# Patient Record
Sex: MALE | HISPANIC OR LATINO | Employment: FULL TIME | URBAN - METROPOLITAN AREA
[De-identification: names, ages, dates, MRNs, and addresses within clinical notes are randomized per-mention and may not be internally consistent; named-entity substitution may affect disease eponyms.]

---

## 2019-01-17 ENCOUNTER — TRANSFERRED RECORDS (OUTPATIENT)
Dept: HEALTH INFORMATION MANAGEMENT | Facility: CLINIC | Age: 54
End: 2019-01-17

## 2019-01-18 ENCOUNTER — MEDICAL CORRESPONDENCE (OUTPATIENT)
Dept: HEALTH INFORMATION MANAGEMENT | Facility: CLINIC | Age: 54
End: 2019-01-18

## 2019-01-18 ENCOUNTER — TRANSFERRED RECORDS (OUTPATIENT)
Dept: HEALTH INFORMATION MANAGEMENT | Facility: CLINIC | Age: 54
End: 2019-01-18

## 2019-01-19 ENCOUNTER — OFFICE VISIT (OUTPATIENT)
Dept: URGENT CARE | Facility: URGENT CARE | Age: 54
End: 2019-01-19
Payer: COMMERCIAL

## 2019-01-19 VITALS
HEART RATE: 66 BPM | SYSTOLIC BLOOD PRESSURE: 150 MMHG | OXYGEN SATURATION: 99 % | DIASTOLIC BLOOD PRESSURE: 90 MMHG | WEIGHT: 183.7 LBS

## 2019-01-19 DIAGNOSIS — H46.9 OPTIC NEURITIS: Primary | ICD-10-CM

## 2019-01-19 DIAGNOSIS — R03.0 ELEVATED BLOOD PRESSURE READING WITHOUT DIAGNOSIS OF HYPERTENSION: ICD-10-CM

## 2019-01-19 LAB
ANION GAP SERPL CALCULATED.3IONS-SCNC: 3 MMOL/L (ref 3–14)
BASOPHILS # BLD AUTO: 0 10E9/L (ref 0–0.2)
BASOPHILS NFR BLD AUTO: 0.4 %
BUN SERPL-MCNC: 19 MG/DL (ref 7–30)
CALCIUM SERPL-MCNC: 8.8 MG/DL (ref 8.5–10.1)
CHLORIDE SERPL-SCNC: 106 MMOL/L (ref 94–109)
CO2 SERPL-SCNC: 28 MMOL/L (ref 20–32)
CREAT SERPL-MCNC: 0.82 MG/DL (ref 0.66–1.25)
DIFFERENTIAL METHOD BLD: NORMAL
EOSINOPHIL # BLD AUTO: 0.2 10E9/L (ref 0–0.7)
EOSINOPHIL NFR BLD AUTO: 3.8 %
ERYTHROCYTE [DISTWIDTH] IN BLOOD BY AUTOMATED COUNT: 12.6 % (ref 10–15)
GFR SERPL CREATININE-BSD FRML MDRD: >90 ML/MIN/{1.73_M2}
GLUCOSE SERPL-MCNC: 107 MG/DL (ref 70–99)
HCT VFR BLD AUTO: 43 % (ref 40–53)
HGB BLD-MCNC: 15.1 G/DL (ref 13.3–17.7)
LYMPHOCYTES # BLD AUTO: 1.4 10E9/L (ref 0.8–5.3)
LYMPHOCYTES NFR BLD AUTO: 26.8 %
MCH RBC QN AUTO: 31.9 PG (ref 26.5–33)
MCHC RBC AUTO-ENTMCNC: 35.1 G/DL (ref 31.5–36.5)
MCV RBC AUTO: 91 FL (ref 78–100)
MONOCYTES # BLD AUTO: 0.5 10E9/L (ref 0–1.3)
MONOCYTES NFR BLD AUTO: 10.1 %
NEUTROPHILS # BLD AUTO: 3.1 10E9/L (ref 1.6–8.3)
NEUTROPHILS NFR BLD AUTO: 58.9 %
PLATELET # BLD AUTO: 194 10E9/L (ref 150–450)
POTASSIUM SERPL-SCNC: 4.7 MMOL/L (ref 3.4–5.3)
RBC # BLD AUTO: 4.74 10E12/L (ref 4.4–5.9)
SODIUM SERPL-SCNC: 137 MMOL/L (ref 133–144)
WBC # BLD AUTO: 5.3 10E9/L (ref 4–11)

## 2019-01-19 PROCEDURE — 80048 BASIC METABOLIC PNL TOTAL CA: CPT | Performed by: PHYSICIAN ASSISTANT

## 2019-01-19 PROCEDURE — 36415 COLL VENOUS BLD VENIPUNCTURE: CPT | Performed by: PHYSICIAN ASSISTANT

## 2019-01-19 PROCEDURE — 99203 OFFICE O/P NEW LOW 30 MIN: CPT | Performed by: PHYSICIAN ASSISTANT

## 2019-01-19 PROCEDURE — 85025 COMPLETE CBC W/AUTO DIFF WBC: CPT | Performed by: PHYSICIAN ASSISTANT

## 2019-01-19 NOTE — PATIENT INSTRUCTIONS
(H46.9) Optic neuritis  (primary encounter diagnosis)  Comment:   Plan: keep follow up with ophthalmologic neurology 2/5/19    (R03.0) Elevated blood pressure reading without diagnosis of hypertension  Comment:   Plan: CBC with platelets and differential, Basic         metabolic panel  (Ca, Cl, CO2, Creat, Gluc, K,         Na, BUN)    Establish care with primary clinic, follow up within 1 week

## 2019-01-19 NOTE — PROGRESS NOTES
No chief complaint on file.    SUBJECTIVE:   Brian Espinal is a 53 year old male presenting with concerns about his blood pressure and glucose levels.  He was also seen by ophthalmology this morning and was advised to have his blood pressure and glucose checked, as some of his eye symptoms may be caused by diabetes and/or high blood pressure.   The ophthalmologist has diagnosed him with optic neuritis.      Seeing neuro ophthalmologist on 2/5/19    SH: he is here with his daughter today who translates for him per his request.        PMH:  Optic neuritis    FH:  Denies any significant family history      There is no problem list on file for this patient.    Social History     Tobacco Use     Smoking status: Never Smoker   Substance Use Topics     Alcohol use: Not on file       ROS:  CONSTITUTIONAL:NEGATIVE for fever, chills, change in weight  INTEGUMENTARY/SKIN: NEGATIVE for worrisome rashes, moles or lesions  EYES: as per HPI  ENT/MOUTH: NEGATIVE for ear, mouth and throat problems  RESP:NEGATIVE for significant cough or SOB  CV: NEGATIVE for chest pain, palpitations or peripheral edema  GI: NEGATIVE for nausea, abdominal pain, heartburn, or change in bowel habits  MUSCULOSKELETAL: NEGATIVE for significant arthralgias or myalgia  NEURO: as per HPI  ENDOCRINE: NEGATIVE for temperature intolerance, skin/hair changes  Review of systems negative except as stated above.    OBJECTIVE  :/90   Pulse 66   Wt 83.3 kg (183 lb 11.2 oz)   SpO2 99%   GENERAL APPEARANCE: healthy, alert and no distress  RESP: lungs clear to auscultation - no rales, rhonchi or wheezes  CV: regular rates and rhythm, normal S1 S2, no murmur noted  NEURO: Normal strength and tone, sensory exam grossly normal,  normal speech and mentation  SKIN: no suspicious lesions or rashes  EXTREMITIES: no edema    (H46.9) Optic neuritis  (primary encounter diagnosis)  Comment:   Plan: keep follow up with ophthalmologic neurology 2/5/19    (R03.0) Elevated  blood pressure reading without diagnosis of hypertension  Comment:   Plan: CBC with platelets and differential, Basic         metabolic panel  (Ca, Cl, CO2, Creat, Gluc, K,         Na, BUN)    Establish care with primary clinic, follow up within 1 week    Patient expresses understanding and agreement with the assessment and plan as above.

## 2019-01-21 ENCOUNTER — TELEPHONE (OUTPATIENT)
Dept: OPHTHALMOLOGY | Facility: CLINIC | Age: 54
End: 2019-01-21

## 2019-01-21 ENCOUNTER — OFFICE VISIT (OUTPATIENT)
Dept: INTERNAL MEDICINE | Facility: CLINIC | Age: 54
End: 2019-01-21
Payer: COMMERCIAL

## 2019-01-21 VITALS
OXYGEN SATURATION: 96 % | SYSTOLIC BLOOD PRESSURE: 120 MMHG | RESPIRATION RATE: 16 BRPM | HEART RATE: 76 BPM | TEMPERATURE: 98.6 F | WEIGHT: 181 LBS | DIASTOLIC BLOOD PRESSURE: 80 MMHG

## 2019-01-21 DIAGNOSIS — R03.0 ELEVATED BLOOD PRESSURE READING WITHOUT DIAGNOSIS OF HYPERTENSION: Primary | ICD-10-CM

## 2019-01-21 LAB
ALBUMIN UR-MCNC: NEGATIVE MG/DL
ANION GAP SERPL CALCULATED.3IONS-SCNC: 1 MMOL/L (ref 3–14)
APPEARANCE UR: CLEAR
BACTERIA #/AREA URNS HPF: ABNORMAL /HPF
BILIRUB UR QL STRIP: NEGATIVE
BUN SERPL-MCNC: 20 MG/DL (ref 7–30)
CALCIUM SERPL-MCNC: 8.8 MG/DL (ref 8.5–10.1)
CHLORIDE SERPL-SCNC: 108 MMOL/L (ref 94–109)
CHOLEST SERPL-MCNC: 175 MG/DL
CO2 SERPL-SCNC: 30 MMOL/L (ref 20–32)
COLOR UR AUTO: YELLOW
CREAT SERPL-MCNC: 0.82 MG/DL (ref 0.66–1.25)
GFR SERPL CREATININE-BSD FRML MDRD: >90 ML/MIN/{1.73_M2}
GLUCOSE SERPL-MCNC: 110 MG/DL (ref 70–99)
GLUCOSE UR STRIP-MCNC: NEGATIVE MG/DL
HDLC SERPL-MCNC: 33 MG/DL
HGB UR QL STRIP: NEGATIVE
KETONES UR STRIP-MCNC: NEGATIVE MG/DL
LDLC SERPL CALC-MCNC: 106 MG/DL
LEUKOCYTE ESTERASE UR QL STRIP: NEGATIVE
MUCOUS THREADS #/AREA URNS LPF: PRESENT /LPF
NITRATE UR QL: NEGATIVE
NONHDLC SERPL-MCNC: 142 MG/DL
PH UR STRIP: 5.5 PH (ref 5–7)
POTASSIUM SERPL-SCNC: 4.7 MMOL/L (ref 3.4–5.3)
RBC #/AREA URNS AUTO: ABNORMAL /HPF
SODIUM SERPL-SCNC: 139 MMOL/L (ref 133–144)
SOURCE: ABNORMAL
SP GR UR STRIP: 1.02 (ref 1–1.03)
TRIGL SERPL-MCNC: 179 MG/DL
UROBILINOGEN UR STRIP-ACNC: 0.2 EU/DL (ref 0.2–1)
WBC #/AREA URNS AUTO: ABNORMAL /HPF

## 2019-01-21 PROCEDURE — 36415 COLL VENOUS BLD VENIPUNCTURE: CPT | Performed by: INTERNAL MEDICINE

## 2019-01-21 PROCEDURE — 80048 BASIC METABOLIC PNL TOTAL CA: CPT | Performed by: INTERNAL MEDICINE

## 2019-01-21 PROCEDURE — 80061 LIPID PANEL: CPT | Performed by: INTERNAL MEDICINE

## 2019-01-21 PROCEDURE — 99214 OFFICE O/P EST MOD 30 MIN: CPT | Performed by: INTERNAL MEDICINE

## 2019-01-21 PROCEDURE — 81001 URINALYSIS AUTO W/SCOPE: CPT | Performed by: INTERNAL MEDICINE

## 2019-01-21 NOTE — TELEPHONE ENCOUNTER
Left message for patient to return my call with use of interp to see if can seen sooner than currently scheduled on 2/5.  Can have him see Dr. Kurtz on Friday 1/25 at 12:15 pm.  If ok can schedule and will need sensorimotor, v/t/gtop/rnfl

## 2019-01-21 NOTE — PATIENT INSTRUCTIONS
- I will contact you with lab results from today.     - If lab testing looks OK, please return in about 6 months for a re-check of your blood pressure.

## 2019-01-21 NOTE — PROGRESS NOTES
SUBJECTIVE:   Brian Espinal is a 53 year old male who presents to clinic today for the following health issues:      ED/UC Followup:    Facility:  [facility]  Date of visit: 01/19/2019  Reason for visit: Optic neuritis/BP  Current Status: Feels ok, his eye concerns him (cannot control eye lid muscle)             Problem list and histories reviewed & adjusted, as indicated.  Additional history: as documented    Here for follow-up urgent care visit, at which time his blood pressure was slightly elevated.  He has no previous history of hypertension, hyperlipidemia, diabetes.  He does not smoke.  He does some resistance training, but no regular aerobic exercise.  Today's blood pressure noted.    Continues to follow-up with ophthalmology regarding recent diagnosis of optic neuritis.    Reviewed and updated as needed this visit by clinical staff       Reviewed and updated as needed this visit by Provider         ROS:  Constitutional, HEENT, cardiovascular, pulmonary, GI, , musculoskeletal, neuro, skin, endocrine and psych systems are negative, except as otherwise noted.    OBJECTIVE:     /80   Pulse 76   Temp 98.6  F (37  C) (Oral)   Resp 16   Wt 82.1 kg (181 lb)   SpO2 96%   There is no height or weight on file to calculate BMI.  GENERAL: healthy, alert and no distress  NECK: no adenopathy, no asymmetry, masses, or scars and thyroid normal to palpation  RESP: lungs clear to auscultation - no rales, rhonchi or wheezes  CV: regular rate and rhythm, normal S1 S2, no S3 or S4, no murmur, click or rub, no peripheral edema and peripheral pulses strong  ABDOMEN: soft, nontender, no hepatosplenomegaly, no masses and bowel sounds normal  MS: no gross musculoskeletal defects noted, no edema        ASSESSMENT/PLAN:     1. Elevated blood pressure reading without diagnosis of hypertension  Today's blood pressure within normal limits on multiple rechecks.  At this point we will not commit to antihypertensive  therapy, but will do further risk stratification with fasting lab work today, and have him return in 6 months for recheck.  Did advise regular aerobic exercise.  - Lipid panel reflex to direct LDL Fasting  - Basic metabolic panel  (Ca, Cl, CO2, Creat, Gluc, K, Na, BUN)  - UA with Microscopic reflex to Culture        Imtiaz Martin MD  [facility]

## 2019-01-24 DIAGNOSIS — H53.10 SUBJECTIVE VISUAL DISTURBANCE: Primary | ICD-10-CM

## 2019-01-25 ENCOUNTER — APPOINTMENT (OUTPATIENT)
Dept: CT IMAGING | Facility: CLINIC | Age: 54
End: 2019-01-25
Payer: COMMERCIAL

## 2019-01-25 ENCOUNTER — DOCUMENTATION ONLY (OUTPATIENT)
Dept: OPHTHALMOLOGY | Facility: CLINIC | Age: 54
End: 2019-01-25

## 2019-01-25 ENCOUNTER — OFFICE VISIT (OUTPATIENT)
Dept: OPHTHALMOLOGY | Facility: CLINIC | Age: 54
End: 2019-01-25
Attending: OPHTHALMOLOGY
Payer: COMMERCIAL

## 2019-01-25 ENCOUNTER — HOSPITAL ENCOUNTER (EMERGENCY)
Facility: CLINIC | Age: 54
Discharge: HOME | End: 2019-01-25
Attending: EMERGENCY MEDICINE | Admitting: EMERGENCY MEDICINE
Payer: COMMERCIAL

## 2019-01-25 ENCOUNTER — APPOINTMENT (OUTPATIENT)
Dept: GENERAL RADIOLOGY | Facility: CLINIC | Age: 54
End: 2019-01-25
Payer: COMMERCIAL

## 2019-01-25 VITALS
RESPIRATION RATE: 16 BRPM | SYSTOLIC BLOOD PRESSURE: 159 MMHG | TEMPERATURE: 98.5 F | OXYGEN SATURATION: 99 % | WEIGHT: 180 LBS | HEART RATE: 80 BPM | DIASTOLIC BLOOD PRESSURE: 79 MMHG

## 2019-01-25 DIAGNOSIS — H53.40 VISUAL FIELD DEFECT: ICD-10-CM

## 2019-01-25 DIAGNOSIS — H49.02: ICD-10-CM

## 2019-01-25 DIAGNOSIS — H49.02 CRANIAL NERVE III PALSY, LEFT: Primary | ICD-10-CM

## 2019-01-25 LAB
ANION GAP SERPL CALCULATED.3IONS-SCNC: 11 MMOL/L (ref 3–14)
BASOPHILS # BLD AUTO: 0 10E9/L (ref 0–0.2)
BASOPHILS NFR BLD AUTO: 0.3 %
BUN SERPL-MCNC: 17 MG/DL (ref 7–30)
CALCIUM SERPL-MCNC: 8.6 MG/DL (ref 8.5–10.1)
CHLORIDE SERPL-SCNC: 106 MMOL/L (ref 94–109)
CO2 SERPL-SCNC: 24 MMOL/L (ref 20–32)
CREAT SERPL-MCNC: 0.83 MG/DL (ref 0.66–1.25)
DIFFERENTIAL METHOD BLD: NORMAL
EOSINOPHIL # BLD AUTO: 0.1 10E9/L (ref 0–0.7)
EOSINOPHIL NFR BLD AUTO: 1.1 %
ERYTHROCYTE [DISTWIDTH] IN BLOOD BY AUTOMATED COUNT: 12.5 % (ref 10–15)
GFR SERPL CREATININE-BSD FRML MDRD: >90 ML/MIN/{1.73_M2}
GLUCOSE SERPL-MCNC: 92 MG/DL (ref 70–99)
HCT VFR BLD AUTO: 44.1 % (ref 40–53)
HGB BLD-MCNC: 15.2 G/DL (ref 13.3–17.7)
IMM GRANULOCYTES # BLD: 0 10E9/L (ref 0–0.4)
IMM GRANULOCYTES NFR BLD: 0.3 %
LYMPHOCYTES # BLD AUTO: 1.4 10E9/L (ref 0.8–5.3)
LYMPHOCYTES NFR BLD AUTO: 21.8 %
MCH RBC QN AUTO: 31.5 PG (ref 26.5–33)
MCHC RBC AUTO-ENTMCNC: 34.5 G/DL (ref 31.5–36.5)
MCV RBC AUTO: 92 FL (ref 78–100)
MONOCYTES # BLD AUTO: 0.5 10E9/L (ref 0–1.3)
MONOCYTES NFR BLD AUTO: 8.7 %
NEUTROPHILS # BLD AUTO: 4.2 10E9/L (ref 1.6–8.3)
NEUTROPHILS NFR BLD AUTO: 67.8 %
NRBC # BLD AUTO: 0 10*3/UL
NRBC BLD AUTO-RTO: 0 /100
PLATELET # BLD AUTO: 233 10E9/L (ref 150–450)
POTASSIUM SERPL-SCNC: 3.8 MMOL/L (ref 3.4–5.3)
RBC # BLD AUTO: 4.82 10E12/L (ref 4.4–5.9)
SODIUM SERPL-SCNC: 141 MMOL/L (ref 133–144)
TSH SERPL DL<=0.005 MIU/L-ACNC: 1.84 MU/L (ref 0.4–4)
WBC # BLD AUTO: 6.2 10E9/L (ref 4–11)

## 2019-01-25 PROCEDURE — 85025 COMPLETE CBC W/AUTO DIFF WBC: CPT | Performed by: EMERGENCY MEDICINE

## 2019-01-25 PROCEDURE — 25000128 H RX IP 250 OP 636: Performed by: EMERGENCY MEDICINE

## 2019-01-25 PROCEDURE — 99285 EMERGENCY DEPT VISIT HI MDM: CPT | Mod: Z6 | Performed by: EMERGENCY MEDICINE

## 2019-01-25 PROCEDURE — 80048 BASIC METABOLIC PNL TOTAL CA: CPT | Performed by: EMERGENCY MEDICINE

## 2019-01-25 PROCEDURE — 96361 HYDRATE IV INFUSION ADD-ON: CPT | Performed by: EMERGENCY MEDICINE

## 2019-01-25 PROCEDURE — 99285 EMERGENCY DEPT VISIT HI MDM: CPT | Mod: 25 | Performed by: EMERGENCY MEDICINE

## 2019-01-25 PROCEDURE — 96360 HYDRATION IV INFUSION INIT: CPT | Mod: 59 | Performed by: EMERGENCY MEDICINE

## 2019-01-25 PROCEDURE — G0463 HOSPITAL OUTPT CLINIC VISIT: HCPCS | Mod: ZF | Performed by: TECHNICIAN/TECHNOLOGIST

## 2019-01-25 PROCEDURE — 84443 ASSAY THYROID STIM HORMONE: CPT | Performed by: EMERGENCY MEDICINE

## 2019-01-25 PROCEDURE — 25000128 H RX IP 250 OP 636: Performed by: STUDENT IN AN ORGANIZED HEALTH CARE EDUCATION/TRAINING PROGRAM

## 2019-01-25 PROCEDURE — 70498 CT ANGIOGRAPHY NECK: CPT

## 2019-01-25 PROCEDURE — 71046 X-RAY EXAM CHEST 2 VIEWS: CPT

## 2019-01-25 PROCEDURE — 25000125 ZZHC RX 250: Performed by: STUDENT IN AN ORGANIZED HEALTH CARE EDUCATION/TRAINING PROGRAM

## 2019-01-25 PROCEDURE — 92133 CPTRZD OPH DX IMG PST SGM ON: CPT | Mod: ZF | Performed by: OPHTHALMOLOGY

## 2019-01-25 PROCEDURE — 92083 EXTENDED VISUAL FIELD XM: CPT | Mod: ZF | Performed by: OPHTHALMOLOGY

## 2019-01-25 RX ORDER — SODIUM CHLORIDE 9 MG/ML
1000 INJECTION, SOLUTION INTRAVENOUS CONTINUOUS
Status: DISCONTINUED | OUTPATIENT
Start: 2019-01-25 | End: 2019-01-25 | Stop reason: HOSPADM

## 2019-01-25 RX ORDER — POLYVINYL ALCOHOL 14 MG/ML
1 SOLUTION/ DROPS OPHTHALMIC PRN
Qty: 15 ML | Refills: 0 | Status: ON HOLD | OUTPATIENT
Start: 2019-01-25 | End: 2019-07-07

## 2019-01-25 RX ORDER — IOPAMIDOL 755 MG/ML
75 INJECTION, SOLUTION INTRAVASCULAR ONCE
Status: COMPLETED | OUTPATIENT
Start: 2019-01-25 | End: 2019-01-25

## 2019-01-25 RX ADMIN — SODIUM CHLORIDE, PRESERVATIVE FREE 90 ML: 5 INJECTION INTRAVENOUS at 16:47

## 2019-01-25 RX ADMIN — IOPAMIDOL 75 ML: 755 INJECTION, SOLUTION INTRAVENOUS at 16:47

## 2019-01-25 RX ADMIN — SODIUM CHLORIDE 1000 ML: 9 INJECTION, SOLUTION INTRAVENOUS at 14:51

## 2019-01-25 ASSESSMENT — CUP TO DISC RATIO
OD_RATIO: 0.35
OS_RATIO: 0.3

## 2019-01-25 ASSESSMENT — EXTERNAL EXAM - LEFT EYE: OS_EXAM: NORMAL

## 2019-01-25 ASSESSMENT — ENCOUNTER SYMPTOMS
SHORTNESS OF BREATH: 0
NAUSEA: 0
HEADACHES: 0
DIARRHEA: 0
VOMITING: 0
FEVER: 0
NECK PAIN: 0

## 2019-01-25 ASSESSMENT — VISUAL ACUITY
OS_SC: 20/20
METHOD: SNELLEN - LINEAR
OS_SC+: -1
OD_SC: 20/20

## 2019-01-25 ASSESSMENT — TONOMETRY
IOP_METHOD: ICARE
OD_IOP_MMHG: 18
OS_IOP_MMHG: 17

## 2019-01-25 ASSESSMENT — EXTERNAL EXAM - RIGHT EYE: OD_EXAM: NORMAL

## 2019-01-25 ASSESSMENT — SLIT LAMP EXAM - LIDS
COMMENTS: COMPLETE PTOSIS
COMMENTS: NORMAL

## 2019-01-25 NOTE — ED TRIAGE NOTES
Pt sent from eye clinic for CT scan. Has noticed worsening left eyelid drooping for 9 days. Reports no other significant past medical history. Takes only a multivitamin daily.

## 2019-01-25 NOTE — ED AVS SNAPSHOT
[facility], [place], Emergency Department  [address] [place] [id number]  Phone:  [phone]                                    Brian Espinal   MRN: [id number]    Department:  [facility], Emergency Department   Date of Visit:  1/25/2019           After Visit Summary Signature Page    I have received my discharge instructions, and my questions have been answered. I have discussed any challenges I see with this plan with the nurse or doctor.    ..........................................................................................................................................  Patient/Patient Representative Signature      ..........................................................................................................................................  Patient Representative Print Name and Relationship to Patient    ..................................................               ................................................  Date                                   Time    ..........................................................................................................................................  Reviewed by Signature/Title    ...................................................              ..............................................  Date                                               Time          22EPIC Rev 08/18

## 2019-01-25 NOTE — PROGRESS NOTES
Assessment & Plan     Brian Espinal is a 53 year old male with the following diagnoses:   1. Cranial nerve III palsy, left       Referred for possible optic neuritis of the left eye. 2 weeks ago, Brian noticed pain when moving left eye. He then noticed left upper eyelid ptosis that started Wednesday 1/16/19. Approximately 1 week prior, he noticed that couldn't move the left eye. Completed ptosis by Thursday 1/17/19. He was given prednisolone eye drops by his optometrist, which did not help so he stopped using it.     MRI brain and orbits with and without contrast 1/18/19: unremarkable except for some microvascular changes. I personally reviewed the scan and agree.      He otherwise denies any headaches. No jaw claudication.  No scalp tenderness.     No history of HTN, Diabetes mellitus, HLP. He states that he gets an annual physical and is generally healthy.      Meds: vitamins.     On examination, his visual acuity is 20/20 both eyes. He has a 1mm relative anisocoria (left greater than right) in light and dark. There was no evidence of afferent pupillary defect. COlor plates were full both eyes. He has significant adduction, elevation and depression deficit left eye. Abduction was full left eye. He has almost a complete left upper eyelid ptosis. The anterior segment examination and funduscopic examination was unremarkable. His CN 4, 5, 6, 7, 8, 11, 12 were unremarkable.     In summary, Brian presents with left third nerve palsy. He does not have optic neuritis. His description of progressive symptoms is extremely concerning for left PCOM aneurysm. I discussed thoroughly with patient and his wife (through ) that he needs to emergently go to the emergency room for CTA of the head to evaluate for LEFT PCOM aneurysm. Patient verbalizes understanding. We called transport and I provided sign out to the ED physician.         Complete documentation of historical and exam elements from today's  encounter can be found in the full encounter summary report (not reduplicated in this progress note).  I personally obtained the chief complaint(s) and history of present illness.  I confirmed and edited as necessary the review of systems, past medical/surgical history, family history, social history, and examination findings as documented by others; and I examined the patient myself.  I personally reviewed the relevant tests, images, and reports as documented above.  I formulated and edited as necessary the assessment and plan and discussed the findings and management plan with the patient and family. - Aries Kurtz MD

## 2019-01-25 NOTE — ED NOTES
Bed: IN03  Expected date: 1/25/19  Expected time:   Means of arrival:   Comments:  Brian Kaye [phone] 54 y/o M sent from optho clinic with L cranial 3rd nerve palsy. Sent for CTA of head to evaluate for PCOM aneurysm.

## 2019-01-25 NOTE — NURSING NOTE
Chief Complaint(s) and History of Present Illness(es)     Diplopia Evaluation     Disease is new.  Associated symptoms include droopy eyelid.              Comments     Eye pain since 2 weeks ago according to spouse ( per ).   Patient states pain in left eye when moving both eyes together.   Blurry vision left eye.  Ptosis VICKY possibly started last Wednesday 1/16/19. Complete ptosis VICKY on Thursday 1/17/19 when he went to ER on Thursday.   No double because of VICKY ptosis but if he tries to lift his VICKY, he notices double.     No diabetes, or HTN.   Blood pressure was elevated per patient. But fine after the second time they checked it last Saturday.   Blood labs done as well - no results yet per patient.     JOE Schultz 1/25/2019 11:13 AM

## 2019-01-25 NOTE — ED PROVIDER NOTES
History     Chief Complaint   Patient presents with     Eye Problem     The history is provided by the patient. The history is limited by a language barrier. A  was used (Honduran).     Brian Espinal is an otherwise healthy 53 year old male  who presents to the Emergency Department today from his eye clinic for further evaluation of left eye problem.  The patient reports that about 10 days ago he started having a left droopy eyelid.  He reports that his symptoms continue to worsen and now he is not able to move his left eye.  The patient was seen by his ophthalmologist today who told him to present to the ED for further evaluation of possible aneurysm restricting blood flow to left eye.  The patient reports that he is still able to see out of his left eye, but is unable to move his left eye or open his left eyelid.  He does report monocular vision loss in the nasal aspect of his left visual field but no dark spots or bright lights. He also reports having some double vision off and on.  Patient reports that he has had a recent decrease in taste on both sides of his tongue.  No headache, neck pain, chest pain, shortness breath, diarrhea, nausea, vomiting. He denies history of diabetes or hypertension.     I have reviewed the Medications, Allergies, Past Medical and Surgical History, and Social History in the [employer] system.    Past Medical History:   Diagnosis Date     NO ACTIVE PROBLEMS      Past Surgical History:   Procedure Laterality Date     GI SURGERY      Possible appendectomy at age 9?     History reviewed. No pertinent family history.    Social History     Tobacco Use     Smoking status: Never Smoker     Smokeless tobacco: Never Used   Substance Use Topics     Alcohol use: Yes     Comment: 3 drinks per month     No current facility-administered medications for this encounter.      No current outpatient medications on file.     Facility-Administered Medications Ordered in Other Encounters    Medication     glucose gel 15-30 g    Or     dextrose 50 % injection 25-50 mL    Or     glucagon injection 1 mg     glucose gel 15-30 g    Or     dextrose 50 % injection 25-50 mL    Or     glucagon injection 1 mg     HYDROcodone-acetaminophen (NORCO) 5-325 MG per tablet 1-2 tablet     lidocaine (LMX4) cream     lidocaine 1 % 1 mL     medication instruction     metoclopramide (REGLAN) tablet 10 mg    Or     metoclopramide (REGLAN) injection 10 mg     naloxone (NARCAN) injection 0.1-0.4 mg     naloxone (NARCAN) injection 0.1-0.4 mg     ondansetron (ZOFRAN-ODT) ODT tab 4 mg    Or     ondansetron (ZOFRAN) injection 4 mg     prochlorperazine (COMPAZINE) injection 10 mg    Or     prochlorperazine (COMPAZINE) tablet 10 mg    Or     prochlorperazine (COMPAZINE) Suppository 25 mg     sodium chloride (PF) 0.9% PF flush 3 mL     sodium chloride (PF) 0.9% PF flush 3 mL     sodium chloride 0.9% infusion     sodium chloride 0.9% infusion        Allergies   Allergen Reactions     No Known Allergies         Review of Systems   Constitutional: Negative for fever.   Eyes: Positive for visual disturbance (double vision; blind spots to medial vision of left eye). Negative for photophobia, pain and discharge.        Unable to move left eye or open left upper eyelid   Respiratory: Negative for cough and shortness of breath.    Cardiovascular: Negative for chest pain.   Gastrointestinal: Negative for diarrhea, nausea and vomiting.   Musculoskeletal: Negative for gait problem and neck pain.   Skin: Negative for rash and wound.   Neurological: Negative for dizziness, facial asymmetry, speech difficulty, weakness and headaches.   Psychiatric/Behavioral: Negative for confusion.   All other systems reviewed and are negative.    Physical Exam   BP: 159/79  Pulse: 80  Temp: 98.5  F (36.9  C)  Resp: 16  Weight: 81.6 kg (180 lb)  SpO2: 99 %    Physical Exam   Constitutional: He is oriented to person, place, and time. He appears well-developed  and well-nourished. No distress.   HENT:   Head: Normocephalic and atraumatic. Head is without contusion, without laceration, without right periorbital erythema and without left periorbital erythema.   Nose: Nose normal.   Mouth/Throat: Oropharynx is clear and moist.   Eyes: Conjunctivae are normal. Pupils are equal, round, and reactive to light. Right eye exhibits no chemosis and no discharge. No foreign body present in the right eye. Left eye exhibits no chemosis and no discharge. No foreign body present in the left eye. Right conjunctiva is not injected. Left conjunctiva is not injected. No scleral icterus. Right eye exhibits normal extraocular motion and no nystagmus. Left eye exhibits abnormal extraocular motion. Left eye exhibits no nystagmus.   Left upper eyelid ptosis. Left eye deviated down and out.   Neck: Normal range of motion. Neck supple.   Cardiovascular: Normal rate, regular rhythm, normal heart sounds and intact distal pulses.   Pulmonary/Chest: Effort normal and breath sounds normal. No stridor. No respiratory distress. He has no wheezes. He has no rales.   Abdominal: Soft. He exhibits no distension. There is no tenderness. There is no guarding.   Musculoskeletal: Normal range of motion. He exhibits no deformity.   Neurological: He is alert and oriented to person, place, and time. He has normal strength. He displays no atrophy and no tremor. No sensory deficit. He exhibits normal muscle tone. Coordination and gait normal. GCS eye subscore is 4. GCS verbal subscore is 5. GCS motor subscore is 6.   Isolated CN III deficit   Skin: Skin is warm and dry. No rash noted. He is not diaphoretic.   Psychiatric: He has a normal mood and affect. His behavior is normal. Thought content normal.   Nursing note and vitals reviewed.      ED Course   1:48 PM  The patient was seen and examined by Dr. West in Room [facility].       Procedures              Critical Care time:  none             Labs Ordered and Resulted from  Time of ED Arrival Up to the Time of Departure from the ED   TSH WITH FREE T4 REFLEX   BASIC METABOLIC PANEL   CBC WITH PLATELETS DIFFERENTIAL   PERIPHERAL IV CATHETER            Assessments & Plan (with Medical Decision Making)   Brian Espinal is a 53 year old male referred from ophtho clinic with down and out left eye deviation and ptosis progressive over last week.     Ddx: CVA, intracerebral/carotid aneurysm, dissection, claudine's syndrome, pancoast tumor, microvascular disease    CN III palsy with bilaterally reactive and grossly equal pupils on my exam. No recent headaches or neck pain. Gradual progression concerning for possible malignancy vs microvascular disease. Will obtain CTA head and neck as well as CXR to screen for left apical mass.     Xray wnl.     Signed out to Dr. Leiva pending results of CT angiogram of the head and neck. Referring ophthalmologist called in requesting call back after CTA completed. She did not want to see patient but would like to know the results of CTA.      I have reviewed the nursing notes.    I have reviewed the findings, diagnosis, plan and need for follow up with the patient.    Final diagnoses:   Third cranial nerve palsy, left   I, Eddie Corral, am serving as a trained medical scribe to document services personally performed by Preeti West MD, based on the provider's statements to me.   IPreeti MD, was physically present and have reviewed and verified the accuracy of this note documented by Eddie Corral.     1/25/2019   [facility], EMERGENCY DEPARTMENT     Preeti West MD  01/26/19 1938

## 2019-01-26 ENCOUNTER — HOSPITAL ENCOUNTER (OUTPATIENT)
Facility: CLINIC | Age: 54
Setting detail: OBSERVATION
Discharge: HOME | DRG: 123 | End: 2019-01-27
Attending: EMERGENCY MEDICINE | Admitting: RADIOLOGY
Payer: COMMERCIAL

## 2019-01-26 ENCOUNTER — APPOINTMENT (OUTPATIENT)
Dept: INTERVENTIONAL RADIOLOGY/VASCULAR | Facility: CLINIC | Age: 54
DRG: 123 | End: 2019-01-26
Payer: COMMERCIAL

## 2019-01-26 DIAGNOSIS — I67.1 CEREBRAL ANEURYSM, NONRUPTURED: ICD-10-CM

## 2019-01-26 DIAGNOSIS — H49.02 WEAKNESS OF LEFT THIRD CRANIAL NERVE: ICD-10-CM

## 2019-01-26 PROBLEM — H49.00 THIRD NERVE PALSY: Status: ACTIVE | Noted: 2019-01-26

## 2019-01-26 PROCEDURE — 99152 MOD SED SAME PHYS/QHP 5/>YRS: CPT

## 2019-01-26 PROCEDURE — 96375 TX/PRO/DX INJ NEW DRUG ADDON: CPT | Performed by: EMERGENCY MEDICINE

## 2019-01-26 PROCEDURE — 25500064 ZZH RX 255 OP 636: Performed by: RADIOLOGY

## 2019-01-26 PROCEDURE — 27210908 ZZH NEEDLE CR4

## 2019-01-26 PROCEDURE — 12000001 ZZH R&B MED SURG/OB UMMC

## 2019-01-26 PROCEDURE — 27210732 ZZH ACCESSORY CR1

## 2019-01-26 PROCEDURE — 27210889 ZZH ACCESSORY CR8

## 2019-01-26 PROCEDURE — C1887 CATHETER, GUIDING: HCPCS

## 2019-01-26 PROCEDURE — 25000128 H RX IP 250 OP 636: Performed by: STUDENT IN AN ORGANIZED HEALTH CARE EDUCATION/TRAINING PROGRAM

## 2019-01-26 PROCEDURE — 99285 EMERGENCY DEPT VISIT HI MDM: CPT | Mod: 25 | Performed by: EMERGENCY MEDICINE

## 2019-01-26 PROCEDURE — C1769 GUIDE WIRE: HCPCS

## 2019-01-26 PROCEDURE — 99153 MOD SED SAME PHYS/QHP EA: CPT

## 2019-01-26 PROCEDURE — 27210802 ZZH SHEATH CR1

## 2019-01-26 PROCEDURE — 27210738 ZZH ACCESSORY CR2

## 2019-01-26 PROCEDURE — 36224 PLACE CATH CAROTD ART: CPT | Mod: 50

## 2019-01-26 PROCEDURE — 36226 PLACE CATH VERTEBRAL ART: CPT

## 2019-01-26 PROCEDURE — G0378 HOSPITAL OBSERVATION PER HR: HCPCS

## 2019-01-26 PROCEDURE — 25000125 ZZHC RX 250: Performed by: STUDENT IN AN ORGANIZED HEALTH CARE EDUCATION/TRAINING PROGRAM

## 2019-01-26 PROCEDURE — 99285 EMERGENCY DEPT VISIT HI MDM: CPT | Mod: Z6 | Performed by: EMERGENCY MEDICINE

## 2019-01-26 PROCEDURE — 36227 PLACE CATH XTRNL CAROTID: CPT | Mod: 50

## 2019-01-26 PROCEDURE — 25800030 ZZH RX IP 258 OP 636: Performed by: STUDENT IN AN ORGANIZED HEALTH CARE EDUCATION/TRAINING PROGRAM

## 2019-01-26 PROCEDURE — 27210905 ZZH KIT CR7

## 2019-01-26 PROCEDURE — 96374 THER/PROPH/DIAG INJ IV PUSH: CPT | Performed by: EMERGENCY MEDICINE

## 2019-01-26 RX ORDER — HEPARIN SODIUM 1000 [USP'U]/ML
2000 INJECTION, SOLUTION INTRAVENOUS; SUBCUTANEOUS
Status: COMPLETED | OUTPATIENT
Start: 2019-01-26 | End: 2019-01-26

## 2019-01-26 RX ORDER — SODIUM CHLORIDE 9 MG/ML
INJECTION, SOLUTION INTRAVENOUS CONTINUOUS
Status: DISCONTINUED | OUTPATIENT
Start: 2019-01-26 | End: 2019-01-27 | Stop reason: HOSPADM

## 2019-01-26 RX ORDER — NALOXONE HYDROCHLORIDE 0.4 MG/ML
.1-.4 INJECTION, SOLUTION INTRAMUSCULAR; INTRAVENOUS; SUBCUTANEOUS
Status: DISCONTINUED | OUTPATIENT
Start: 2019-01-26 | End: 2019-01-27 | Stop reason: HOSPADM

## 2019-01-26 RX ORDER — PROCHLORPERAZINE 25 MG
25 SUPPOSITORY, RECTAL RECTAL EVERY 12 HOURS PRN
Status: DISCONTINUED | OUTPATIENT
Start: 2019-01-26 | End: 2019-01-27 | Stop reason: HOSPADM

## 2019-01-26 RX ORDER — DEXTROSE MONOHYDRATE 25 G/50ML
25-50 INJECTION, SOLUTION INTRAVENOUS
Status: DISCONTINUED | OUTPATIENT
Start: 2019-01-26 | End: 2019-01-27 | Stop reason: HOSPADM

## 2019-01-26 RX ORDER — HYDROCODONE BITARTRATE AND ACETAMINOPHEN 5; 325 MG/1; MG/1
1-2 TABLET ORAL EVERY 4 HOURS PRN
Status: DISCONTINUED | OUTPATIENT
Start: 2019-01-26 | End: 2019-01-27 | Stop reason: HOSPADM

## 2019-01-26 RX ORDER — ONDANSETRON 2 MG/ML
4 INJECTION INTRAMUSCULAR; INTRAVENOUS EVERY 6 HOURS PRN
Status: DISCONTINUED | OUTPATIENT
Start: 2019-01-26 | End: 2019-01-27 | Stop reason: HOSPADM

## 2019-01-26 RX ORDER — IODIXANOL 320 MG/ML
150 INJECTION, SOLUTION INTRAVASCULAR ONCE
Status: COMPLETED | OUTPATIENT
Start: 2019-01-26 | End: 2019-01-26

## 2019-01-26 RX ORDER — NICOTINE POLACRILEX 4 MG
15-30 LOZENGE BUCCAL
Status: DISCONTINUED | OUTPATIENT
Start: 2019-01-26 | End: 2019-01-27 | Stop reason: HOSPADM

## 2019-01-26 RX ORDER — LIDOCAINE 40 MG/G
CREAM TOPICAL
Status: DISCONTINUED | OUTPATIENT
Start: 2019-01-26 | End: 2019-01-27 | Stop reason: HOSPADM

## 2019-01-26 RX ORDER — METOCLOPRAMIDE 5 MG/1
10 TABLET ORAL EVERY 6 HOURS PRN
Status: DISCONTINUED | OUTPATIENT
Start: 2019-01-26 | End: 2019-01-27 | Stop reason: HOSPADM

## 2019-01-26 RX ORDER — METOCLOPRAMIDE HYDROCHLORIDE 5 MG/ML
10 INJECTION INTRAMUSCULAR; INTRAVENOUS EVERY 6 HOURS PRN
Status: DISCONTINUED | OUTPATIENT
Start: 2019-01-26 | End: 2019-01-27 | Stop reason: HOSPADM

## 2019-01-26 RX ORDER — PROCHLORPERAZINE MALEATE 10 MG
10 TABLET ORAL EVERY 6 HOURS PRN
Status: DISCONTINUED | OUTPATIENT
Start: 2019-01-26 | End: 2019-01-27 | Stop reason: HOSPADM

## 2019-01-26 RX ORDER — FLUMAZENIL 0.1 MG/ML
0.2 INJECTION, SOLUTION INTRAVENOUS
Status: DISCONTINUED | OUTPATIENT
Start: 2019-01-26 | End: 2019-01-26

## 2019-01-26 RX ORDER — ONDANSETRON 4 MG/1
4 TABLET, ORALLY DISINTEGRATING ORAL EVERY 6 HOURS PRN
Status: DISCONTINUED | OUTPATIENT
Start: 2019-01-26 | End: 2019-01-27 | Stop reason: HOSPADM

## 2019-01-26 RX ORDER — FENTANYL CITRATE 50 UG/ML
25-50 INJECTION, SOLUTION INTRAMUSCULAR; INTRAVENOUS EVERY 5 MIN PRN
Status: DISCONTINUED | OUTPATIENT
Start: 2019-01-26 | End: 2019-01-26

## 2019-01-26 RX ADMIN — FENTANYL CITRATE 100 MCG: 50 INJECTION, SOLUTION INTRAMUSCULAR; INTRAVENOUS at 14:30

## 2019-01-26 RX ADMIN — IODIXANOL 40 ML: 320 INJECTION, SOLUTION INTRAVASCULAR at 14:39

## 2019-01-26 RX ADMIN — HEPARIN SODIUM 2000 UNITS: 1000 INJECTION, SOLUTION INTRAVENOUS; SUBCUTANEOUS at 13:58

## 2019-01-26 RX ADMIN — LIDOCAINE HYDROCHLORIDE 8 ML: 10 INJECTION, SOLUTION EPIDURAL; INFILTRATION; INTRACAUDAL; PERINEURAL at 13:55

## 2019-01-26 RX ADMIN — HEPARIN SODIUM 1000 ML: 1000 INJECTION, SOLUTION INTRAVENOUS; SUBCUTANEOUS at 14:20

## 2019-01-26 RX ADMIN — MIDAZOLAM HYDROCHLORIDE 2 MG: 2 INJECTION, SOLUTION INTRAMUSCULAR; INTRAVENOUS at 14:30

## 2019-01-26 RX ADMIN — SODIUM CHLORIDE: 9 INJECTION, SOLUTION INTRAVENOUS at 16:16

## 2019-01-26 ASSESSMENT — ENCOUNTER SYMPTOMS
WEAKNESS: 0
COUGH: 0
SPEECH DIFFICULTY: 0
FACIAL ASYMMETRY: 0
DIZZINESS: 0
EYE DISCHARGE: 0
EYE PAIN: 1
WOUND: 0
EYE PAIN: 0
CONFUSION: 0
PHOTOPHOBIA: 0

## 2019-01-26 ASSESSMENT — VISUAL ACUITY
OU: OTHER (SEE COMMENT)

## 2019-01-26 ASSESSMENT — ACTIVITIES OF DAILY LIVING (ADL)
ADLS_ACUITY_SCORE: 15
ADLS_ACUITY_SCORE: 15

## 2019-01-26 ASSESSMENT — MIFFLIN-ST. JEOR: SCORE: 1598.35

## 2019-01-26 NOTE — PROGRESS NOTES
"Brief Update:    Patient presented to clinic today with symptoms of a left 3rd nerve palsy with concern for compression and was sent to ED for CTA to rule out aneurysm. Initial CTA read from radiology without mention of aneurysm so patient released from ED by ED staff. 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 Discussed with neuro-IR fellow who agrees with presence of small aneurysm and recommends formal cerebral angiogram for evaluation. Per neuro-IR fellow, aneurysm is small and questionable whether causing the palsy symptoms.    PLAN:  - Neuro-IR fellow to discuss with attending regarding need for additional work-up (cerebral angiogram). Neuro-IR will plan to contact patient to schedule procedure and/or follow-up    Darrel Young MD  Ophthalmology Resident, PGY-2  Pager: [phone]    "

## 2019-01-26 NOTE — DISCHARGE INSTRUCTIONS
Please make an appointment to follow up with Eye Clinic (phone: [phone]) in 7-10 days.  Artificial tears to the left eye 4 times/ day as needed .

## 2019-01-26 NOTE — PROGRESS NOTES
Patient Name: Brian Espinal  Medical Record Number: [id number]  Today's Date: 1/26/2019    Procedure: Diagnostic Cerebral Angiogram with Femoral Artery Closure Device  Proceduralist: Dr. Santiago Horton and Dr.Tapan Carlson    Sedation start time: 1350  Sedation end time: 1430  Sedation medications administered: Fentanyl 100 mcg, Versed 2 mg  Total sedation time: 40 minutes    Procedure start time: 1350  Puncture time: 1353  Procedure end time: 1430    Report given to: CRISTIAN Gray 6A  : n/a    Other Notes: Pt arrived to IR room #3 from Emergency Dept. Consent reviewed. Pt denies any questions or concerns regarding procedure. Pt positioned supine and monitored per protocol. Cerebral Angiogram done. [employer] closure device deployed to right femoral artery at 1425. Flat bedrest until 1625.  See flowsheet for Neuro exam. Pt tolerated procedure without any noted complications. Pt transferred to Unit 6A post-procedure.

## 2019-01-26 NOTE — ED NOTES
Patient signed out to me by Dr West with head and neck CTA pending.    Patient with several days of diplopia and ptosis. Seen in eye clinic this morning. MRI WNL. Referred to ED for CTA of head and neck. 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 No other lesions identified. Complete eye exam earlier today, note reviewed.    Discussed with ophthalmology on call.     He can follow up in the eye clinic. No secific treatment recommended.     Faustino Gutierrez MD  01/25/19 [id number]

## 2019-01-26 NOTE — H&P
[facility], [place]     Endovascular Surgical Neuroradiology Pre-Procedure Note      HPI:  Brian Espinal is a 53 year old male who is in good health otherwise has been having subacute inciduous onset of dipplopia, left eye pain that progressed to near complete third nerve palsy with parasympathetic and motor component. His MRI is normal, and had CTA for which he was in the ER yesterday. 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 Further reviewing of the images does show asymmetric opacification of left transverse sinus and larger SOV compared to right side. We called the patient and he did mention of mild to moderate pain in the eye which is certainly worse than the onset of this episode.     Medical History:  Past Medical History:   Diagnosis Date     NO ACTIVE PROBLEMS        Surgical History:  Past Surgical History:   Procedure Laterality Date     GI SURGERY      Possible appendectomy at age 9?       Family History:  History reviewed. No pertinent family history.    Social History:  Social History     Socioeconomic History     Marital status:      Spouse name: Not on file     Number of children: 3     Years of education: Not on file     Highest education level: Not on file   Social Needs     Financial resource strain: Not on file     Food insecurity - worry: Not on file     Food insecurity - inability: Not on file     Transportation needs - medical: Not on file     Transportation needs - non-medical: Not on file   Occupational History     Not on file   Tobacco Use     Smoking status: Never Smoker     Smokeless tobacco: Never Used   Substance and Sexual Activity     Alcohol use: Yes     Comment: 3 drinks per month     Drug use: No     Sexual activity: Not on file   Other Topics Concern     Not on file   Social History Narrative     Not on file       Allergies:  No Known Allergies    Is there a contrast allergy?  No    Medications:    (Not in a  hospital admission).    ROS:  The 5 point Review of Systems is negative other than noted in the HPI or here.     PHYSICAL EXAMINATION  Vital Signs:  B/P: 152/77,  T: 98.4,  P: 68,  R: 16    Cardio:  RRR  Pulmonary:  no respiratory distress  Abdomen:  soft    Alert, oriented to time place and person.  Speech fluent with normal naming, repetition, comprehension. Left pupil reactive but slightly larger than right, the left eye is depressed and abducted with most gaze restriction in upward and medial direction.  Visual fields full.  Facial sensation, movement normal.  Palate moves symmetrically.  Tongue midline.  Sternocleidomastoid and trapezius strength intact.  Motor 5/5 in all four extremitis, sensation intact to touch bilaterally without any neglect.  No dysmetria noted.  Gait deferred.     LABS  (most recent Cr, BUN, GFR, PLT, INR, PTT within the past 7 days):  Recent Labs   Lab 01/25/19  1452   CR 0.83   BUN 17   GFRESTIMATED >90   GFRESTBLACK >90           Platelet Function P2Y12 (PRU):  Not applicable      ASSESSMENT: Possible CC fistula on left side. Small hypophysial aneurysm also noted.    PLAN: Diagnostic cerebral angiogram         PRE-PROCEDURE SEDATION ASSESSMENT     Pre-Procedure Sedation Assessment done at 1400.    Expected Level:  Moderate Sedation    Indication:  Sedation is required to allow for neurointerventional procedure.    Consent obtained from patient after discussing the risks, benefits and alternatives.     PO Intake:  Appropriately NPO for procedure    ASA Class:  Class 2 - MILD SYSTEMIC DISEASE, NO ACUTE PROBLEMS, NO FUNCTIONAL LIMITATIONS.    Mallampati:  Grade 2:  Soft palate, base of uvula, tonsillar pillars, and portion of posterior pharyngeal wall visible    History and physical reviewed and no updates needed. I have reviewed the lab findings, diagnostic data, medications, and the plan for sedation. I have determined this patient to be an appropriate candidate for the planned  sedation and procedure and have reassessed the patient IMMEDIATELY PRIOR to sedation and procedure.    Patient was discussed with the Attending, Dr. Horton, who agrees with the plan.    Arnulfo Carlson   Pager: [phone]

## 2019-01-26 NOTE — ED NOTES
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 Neurointerventional to take to IR from ED.

## 2019-01-26 NOTE — PROCEDURES
[facility], [place]     Endovascular Surgical Neuroradiology Post-Procedure Note    Pre-Procedure Diagnosis:  Left third nerve palsy   Post-Procedure Diagnosis:  Left third nerve palsy     Procedure(s):   Diagnostic cervicocerebral angiography    Findings:  No identifiable vascular lesion explaining right 3rd nerve palsy     Plan:  Admit overnight for observation     Primary Surgeon:  Dr. Santiago Horton  Secondary Surgeon:  Not applicable  Secondary Surgeon Review:  None  Fellow:  Robyn  Additional Assistants:  TEGAN    Prior to the start of the procedure and with procedural staff participation, I verbally confirmed: the patient s identity using two indicators, relevant allergies, that the procedure was appropriate and matched the consent or emergent situation, and that the correct equipment/implants were available. Immediately prior to starting the procedure I conducted the Time Out with the procedural staff and re-confirmed the patient s name, procedure, and site/side. (The Joint Commission universal protocol was followed.)  Yes    PRU value: Not applicable    Anesthesia:  Conscious Sedation  Medications:  Fentanyl, Methohexital  Puncture site:  Right Femoral Artery    Fluoroscopy time (minutes):  18.9  Radiation dose (mGy):  1614    Contrast amount (mL): 40    Estimated blood loss (mL):  Minimal     Closure:  Device Starclose     Disposition:  Will be followed in hospital by the Neuro Endovascular team.        Sedation Post-Procedure Summary    Sedatives: Fentanyl and Midazolam (Versed)    Vital signs and pulse oximetry were monitored and remained stable throughout the procedure, and sedation was maintained until the procedure was complete.  The patient was monitored by staff until sedation discharge criteria were met.    Patient tolerance:  Patient tolerated the procedure well with no immediate complications.    Time of sedation in minutes:  60 minutes from beginning to end of  physician one to one monitoring.    Arnulfo Carlson  Pager:  [phone]

## 2019-01-26 NOTE — ED PROVIDER NOTES
[place] EMERGENCY DEPARTMENT ([facility])  January 26, 2019    History     Chief Complaint   Patient presents with     Eye Problem     A  was used (Use of Official iPad ).     Brian Espinal is a 53 year old male who presents to the ED for abnormal lab result. Per chart review, patient has been seen and evaluated yesterday in the ED and Ophthalmology for 11 days of ongoing left eye problem which Ophthalmology diagnosed as  3rd nerve palsy with concern for a compression or aneurysm.  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  Per telephone note yesterday, Neuro-IR agrees with the present of small aneurysm and they advised the patient to return to the ED for cerebral angiogram for further evaluation.    Patient states he was told to come back to the ED but was unsure what was going on.  States that he has some left eye pain with movement and reports that he can open his eyelid.    PAST MEDICAL HISTORY  Past Medical History:   Diagnosis Date     NO ACTIVE PROBLEMS      PAST SURGICAL HISTORY  Past Surgical History:   Procedure Laterality Date     GI SURGERY      Possible appendectomy at age 9?     FAMILY HISTORY  History reviewed. No pertinent family history.  SOCIAL HISTORY  Social History     Tobacco Use     Smoking status: Never Smoker     Smokeless tobacco: Never Used   Substance Use Topics     Alcohol use: Yes     Comment: 3 drinks per month     MEDICATIONS  No current facility-administered medications for this encounter.      Current Outpatient Medications   Medication     polyvinyl alcohol (LIQUIFILM TEARS) 1.4 % ophthalmic solution     Multiple Vitamins-Minerals (MULTIVITAMIN ADULT PO)     ALLERGIES  No Known Allergies      I have reviewed the Medications, Allergies, Past Medical and Surgical History, and Social History in the Epic  "system.    Review of Systems   Eyes: Positive for pain (L).   All other systems reviewed and are negative.      Physical Exam   BP: 135/79  Pulse: 74  Temp: 98.4  F (36.9  C)  Resp: 16  Height: 165.1 cm (5' 5\")  Weight: 81.6 kg (180 lb)  SpO2: 98 %      Physical Exam  Physical Exam   Constitutional: oriented to person, place, and time. appears well-developed and well-nourished.   HENT:   Head: Normocephalic and atraumatic.  Neck: Normal range of motion.   Pulmonary/Chest: Effort normal. No respiratory distress.   Cardiac: No murmurs, rubs, gallops. RRR.  Abdominal: Abdomen soft, nontender, nondistended. No rebound tenderness.  MSK: Long bones without deformity or evidence of trauma  Neurological: alert and oriented to person, place, and time. Left pupil 4 mm , Right pupil 3 mm.  Left pupil is not reactive to light with direct and indirect light.  Right pupil normally contracts to light.  Left eye is held abducted with slight downward preference.  Unable to move the left eye superiorly or medially.  Strength in upper and lower extremities intact.  No C-spine tenderness.  Sensation over the face intact.  No facial droop.  Unable to elevate left eyelid.  Skin: Skin is warm and dry.   Psychiatric:  normal mood and affect.  behavior is normal. Thought content normal.     ED Course        Procedures   11:21 AM  The patient was seen and examined by Dr. Hidalgo in Room 14.       Labs Ordered and Resulted from Time of ED Arrival Up to the Time of Departure from the ED - No data to display         Assessments & Plan (with Medical Decision Making)   MDM  Patient presenting with ongoing difficulty with I.  Does appear that he has a cranial nerve third palsy on the left side, possibly due to an aneurysm.  Neuro IR to come in to see the patient for angiogram and admission.    I have reviewed the nursing notes.    I have reviewed the findings, diagnosis, plan and need for follow up with the patient.       Medication List      There " are no discharge medications for this visit.         Final diagnoses:   Cerebral aneurysm, nonruptured   Weakness of left third cranial nerve     I, Nathan Marcum, am serving as a trained medical scribe to document services personally performed by Harrison Hidalgo MD, based on the provider's statements to me.      IHarrison MD, was physically present and have reviewed and verified the accuracy of this note documented by Nathan Marcum.     1/26/2019   [facility], [place], EMERGENCY DEPARTMENT     Harrison Hidalgo MD  01/26/19 4783

## 2019-01-26 NOTE — ED TRIAGE NOTES
Pt presents with c/o left eye drooping. Seen in ED yesterday and told to return for additional test.

## 2019-01-27 VITALS
HEIGHT: 65 IN | OXYGEN SATURATION: 97 % | TEMPERATURE: 97.5 F | DIASTOLIC BLOOD PRESSURE: 62 MMHG | RESPIRATION RATE: 16 BRPM | BODY MASS INDEX: 29.99 KG/M2 | SYSTOLIC BLOOD PRESSURE: 159 MMHG | WEIGHT: 180 LBS | HEART RATE: 62 BPM

## 2019-01-27 LAB
ANION GAP SERPL CALCULATED.3IONS-SCNC: 8 MMOL/L (ref 3–14)
BUN SERPL-MCNC: 21 MG/DL (ref 7–30)
CALCIUM SERPL-MCNC: 8.2 MG/DL (ref 8.5–10.1)
CHLORIDE SERPL-SCNC: 109 MMOL/L (ref 94–109)
CO2 SERPL-SCNC: 23 MMOL/L (ref 20–32)
CREAT SERPL-MCNC: 0.79 MG/DL (ref 0.66–1.25)
ERYTHROCYTE [DISTWIDTH] IN BLOOD BY AUTOMATED COUNT: 12.3 % (ref 10–15)
GFR SERPL CREATININE-BSD FRML MDRD: >90 ML/MIN/{1.73_M2}
GLUCOSE SERPL-MCNC: 96 MG/DL (ref 70–99)
HCT VFR BLD AUTO: 42.6 % (ref 40–53)
HGB BLD-MCNC: 14.7 G/DL (ref 13.3–17.7)
MCH RBC QN AUTO: 32.1 PG (ref 26.5–33)
MCHC RBC AUTO-ENTMCNC: 34.5 G/DL (ref 31.5–36.5)
MCV RBC AUTO: 93 FL (ref 78–100)
PLATELET # BLD AUTO: 182 10E9/L (ref 150–450)
POTASSIUM SERPL-SCNC: 4 MMOL/L (ref 3.4–5.3)
RBC # BLD AUTO: 4.58 10E12/L (ref 4.4–5.9)
SODIUM SERPL-SCNC: 140 MMOL/L (ref 133–144)
WBC # BLD AUTO: 5.8 10E9/L (ref 4–11)

## 2019-01-27 PROCEDURE — 80048 BASIC METABOLIC PNL TOTAL CA: CPT | Performed by: STUDENT IN AN ORGANIZED HEALTH CARE EDUCATION/TRAINING PROGRAM

## 2019-01-27 PROCEDURE — 85027 COMPLETE CBC AUTOMATED: CPT | Performed by: STUDENT IN AN ORGANIZED HEALTH CARE EDUCATION/TRAINING PROGRAM

## 2019-01-27 PROCEDURE — G0378 HOSPITAL OBSERVATION PER HR: HCPCS

## 2019-01-27 PROCEDURE — 36415 COLL VENOUS BLD VENIPUNCTURE: CPT | Performed by: STUDENT IN AN ORGANIZED HEALTH CARE EDUCATION/TRAINING PROGRAM

## 2019-01-27 ASSESSMENT — ACTIVITIES OF DAILY LIVING (ADL)
ADLS_ACUITY_SCORE: 15
ADLS_ACUITY_SCORE: 10

## 2019-01-27 NOTE — PLAN OF CARE
Pt POD#2 R diagnostic cerebral angiogram, vs ex HTN w/in parameters, neuros include: a/o x4, blurry vision when pt has both eyes open, L eye palsy, can't track L. PIV removed per discharge orders, regular diet, voiding spont, up ad michelle. R groin site CDI, soft, no bruising and/or drainage noted. RN went over discharge education via  phone w/wife and pt, pt and wife actively participating in education. Pt's daughter picked him up to take home.

## 2019-01-27 NOTE — PHARMACY-ADMISSION MEDICATION HISTORY
"Admission medication history interview status for the 1/26/2019 admission is complete. See Epic admission navigator for allergy information, pharmacy, prior to admission medications and immunization status.     Medication history interview sources:  Pt, med list, and Surescripts     Changes made to PTA medication list (reason)  Added: None  Deleted: Yes, Multiple Vitamins-Minerals \"Neurobion\" injection- Pt reported finishing all the 3 dose of the medication yesterday.  Changed: None     Additional medication history information (including reliability of information, actions taken by pharmacist):  Reliability: Pt knows his medication well   Pharmacy: [email or web address] -phone:[phone]  Allergies: Confirmed   - Pt confirmed not taking other prescription med, OTCs, supplements, topicals, recreational drug, or herbals.    Prior to Admission medications    Medication Sig Last Dose Taking? Auth Provider   polyvinyl alcohol (LIQUIFILM TEARS) 1.4 % ophthalmic solution Place 1 drop Into the left eye as needed for dry eyes 1/25/2019 at Unknown time Yes Faustino Gutierrez MD         Medication history completed by: Hina Marley, [email or web address], Student Pharmacist     "

## 2019-01-27 NOTE — PLAN OF CARE
D AVSS with sat's 97% on room air. Heart regular and lungs decreased in bases otherwise clear. Voiced no c/o pain or nausea during the night with CMS intact in l/e and slept well between cares. Up to bathroom independently to void an adequate amount. Spouse in room for support.   I Vital's, assessment and med's per order.   A Resting in bed with call light in reach.   P Continue to monitor and update Md with changes. Patient hoping to be discharge to home today.

## 2019-01-27 NOTE — DISCHARGE SUMMARY
[facility], [place]    Endovascular Surgical Neuroradiology Discharge Summary    Date of Admission: 1/26/2019  Date of Discharge: 01/27/2019    Disposition: Discharged to home  Primary Care Physician: No Ref-Primary, Physician      Admission Diagnosis:   Left 3rd nerve palsy    Discharge Diagnosis:   Left 3rd nerve palsy    Problem Leading to Hospitalization (from HPI):     Please see H&P dated 1/26/2019 for further details about presentation.    Brief Hospital Course:   Patient was admitted to neurointensive care unit for overnight observation after procedure. He had an uneventful course. There were no new problems. Patient was tolerating oral diet and was ambulating independently. Her groin access site was dry and intact without any evidence of hematoma or tenderness. She was provided detailed explanation about her treatment and follow-up plan. She was discharged home in stable condition.     Complications: None.     Other problems addressed during this hospitalization:    PHYSICAL EXAMINATION  Vital Signs:  B/P: 159/62, T: 97.5, P: 62, R: 16    Mentation: Awake, alert & oriented x3  Speech: Normal. No dysarthria or aphasia  Cranial nerve exam: Intact except left 3rd Shoulder shrug strong B/L, tongue movements intact  Motor: Strength 5/5 throughout  Sensations: Intact B/L to light touch  Coordination: Finger to nose test intact B/L  Gait: Deferred    mRS 6 - Dead.    Medications    Current Discharge Medication List      CONTINUE these medications which have NOT CHANGED    Details   polyvinyl alcohol (LIQUIFILM TEARS) 1.4 % ophthalmic solution Place 1 drop Into the left eye as needed for dry eyes  Qty: 15 mL, Refills: 0             Additional recommendations and follow up:       Occupational Therapy Referral      Reason for your hospital stay    You were admitted for further evaluation of right eye weakness.  You had a diagnostic cerebral angiogram which did not show any vascular  abnormality explaining right eye weakness. Please follow up with ophthalmology for further follow up.     Adult [facility] Follow-up and recommended labs and tests    Follow up with primary care provider, Physician No Ref-Primary, within 7 days for hospital follow- up.  No follow up labs or test are needed.      Appointments on [place] and/or [facility] (with [facility] or [facility] provider or service). Call [phone] if you haven't heard regarding these appointments within 7 days of discharge.     Activity    What you may eat or drink after angiogram:  -- There are no changes in what you should eat or drink after this test except that you should drink at least six to eight 8-ounce glasses of fluid each day for 2 days to flush the contrast (dye) out of your body unless you are on a limited fluids diet.    Moving around after angiogram:  -- After your test: Rest 48 hours and follow the special activity instructions listed.    Special activity restrictions:  -- Limit physical activity for 48 hours.  Rest on a sofa or bed as much as possible.  -- No strenuous activity.  -- No long walks.  -- Avoid climbing stairs if possible.  If you must climb stairs, do it slowly.    Lifting:  -- Do not lift more than 10 pounds for 48 hours.   -- Do not lift more than 20 pounds for 7 days. (A full gallon of water weighs about 8 pounds)    Sexual activity:  -- You can resume sexual activity in 2 days.  Bathing/Swimming:  -- You may shower in 24 hours .  -- You may NOT take a tub bath, swim, or sit in water for 5 days.    The groin puncture site:  Care:  -- Keep the area clean and dry except for during daily shower.  -- Clean the site daily using soap and water while standing in the shower.  Pat dry thoroughly.  -- Cover the groin site with a bandaid until the skin has closed.   -- When you sneeze, cough or laugh, hold pressure on the puncture site.  -- If you must strain when having a bowel movement, hold gentle pressure to the  puncture site.    Anesthesia or sedation information:  You have received medication for your procedure that made you sleepy:  -- You may be sleepy, feel less coordinated or feel weak.  To prevent injury, be careful when you walk, bathe, cook or use electrical devices/tools.  -- You may NOT drive or operate mechanical equipment until 24 hours after your procedure.  You also must stop taking narcotic pain medications before driving.  -- A responsible adult should remain with you for at least 24 hours after your procedure.  You should stay at home and rest today.  -- This may cause you to react differently to alcohol.  Do not drink alcohol for at least 24 hours after your procedure.  -- This may cause you to not think clearly.  Do not make important decisions for 24 hours after your procedure.  -- To prevent burns, do not smoke.    Local anesthesia:  -- You had local anesthesia (numbing medicine) for your procedure.  The numbness should go away a few hours after the procedure.    Your medicines:  -- It is important that you take the medicines on your list.  Work with your health care provider or pharmacist if you have questions about your medicine.    Return to work:  -- You can return to work in 24 hours if your work does not stop you from following your care instructions (such as lifting).  If your work does not allow you to follow the instructions, you should return to work in 48 hours.     Discharge Instructions    Please see activity instructions.     Reason for your hospital stay    You were admitted to the hospital for left eye weakness. Your diagnostic angiogram did not show any vascular abnormality explaining this weakness. Please follow up with ophthalmology for further follow up.     Diet    Follow this diet upon discharge: Orders Placed This Encounter      Regular Diet Adult     Plan : Follow up with ophthalmology for further management of 3rd nerve palsy. Cerebral angiogram did not show any vascular  explanation of left third nerve palsy.       Patient was discussed with the attending physician, Dr. Horton.    Arnulfo Carlson  Neuro interventional fellow   [phone]

## 2019-01-27 NOTE — PLAN OF CARE
[id number]    Status: Pt admitted w/left third nerve palsy after diagnostic cervicocerebral angiography 1/26   VS: VSS on RA    Neuros: A&Ox4, Intact. Pt states blurry vision at baseline, L eye palsy, pt unable to tract with L eye   GI: Regular diet, adequate intake   :  Voiding spontaneously without difficulty   IV: PIV SL   Activity: SBA   Pain: Pt stated slight pain around angio site, declined intervention   Respiratory/Trach: WNL  Skin: Angio site, CDI, pulse +   Social: Family at bedside, involved in cares, interrupter present for shift    Plan of care: Continue to monitor site, continue with POC, possible discharge tomorrow morning

## 2019-01-28 ENCOUNTER — TELEPHONE (OUTPATIENT)
Dept: INTERNAL MEDICINE | Facility: CLINIC | Age: 54
End: 2019-01-28

## 2019-01-28 NOTE — TELEPHONE ENCOUNTER
"  ED for acute condition Discharge Protocol    \"Hi, my name is Robertarafiq Hawkins, a registered nurse, and I am calling from [facility].  I am calling to follow up and see how things are going for you after your recent emergency visit.\"    Tell me how you are doing now that you are home?\" Doing well. Doctor told me they didn't find much. No weakness. But a little pain when I move my eye but I will f/u with opthamologly       Discharge Instructions    \"Let's review your discharge instructions.  What is/are the follow-up recommendations?  Pt. Response: Follow up with ophthalmology for further management of 3rd nerve palsy. Cerebral angiogram did not show any vascular explanation of left third nerve palsy.       Follow up with primary care provider, Physician No Ref-Primary, within 7 days for hospital follow- up.  No follow up labs or test are needed.       Appointments on [place] and/or [facility] (with [facility] or [facility] provider or service). Call [phone] if you haven't heard regarding these appointments within 7 days of discharge.    \"Has an appointment with your primary care provider been scheduled?\"  No but will see opthalmology first.    Medications    \"Tell me what changed about your medicines when you discharged?\"    None    \"What questions do you have about your medications?\"   None        Call Summary    \"What questions or concerns do you have about your recent visit and your follow-up care?\"     none    \"If you have questions or things don't continue to improve, we encourage you contact us through the main clinic number (give number).  Even if the clinic is not open, triage nurses are available 24/7 to help you.     We would like you to know that our clinic has extended hours (provide information).  We also have urgent care (provide details on closest location and hours/contact info)\"    \"Thank you for your time and take care!\"                "

## 2019-01-28 NOTE — TELEPHONE ENCOUNTER
Patient requesting lab results from 1/21/2019.    He was fasting but did chew gum before his lab draw. Please advise. Ok for him to follow up in 6 months?    Roberta KINNEY RN, BSN, PHN

## 2019-01-29 NOTE — TELEPHONE ENCOUNTER
I actually recommend that he come back to see me in a few weeks.  Given his recent CTA head results, I think we need to be a little more aggressive with cholesterol and blood pressure control.    He should make appointment with me for sometime in the next few weeks.

## 2019-01-31 ENCOUNTER — HOSPITAL ENCOUNTER (OUTPATIENT)
Dept: OCCUPATIONAL THERAPY | Facility: CLINIC | Age: 54
Setting detail: THERAPIES SERIES
End: 2019-01-31
Attending: OPHTHALMOLOGY
Payer: COMMERCIAL

## 2019-01-31 DIAGNOSIS — H49.02 WEAKNESS OF LEFT THIRD CRANIAL NERVE: ICD-10-CM

## 2019-01-31 PROCEDURE — 97535 SELF CARE MNGMENT TRAINING: CPT | Mod: GO,XU | Performed by: REHABILITATION PRACTITIONER

## 2019-01-31 PROCEDURE — 97530 THERAPEUTIC ACTIVITIES: CPT | Mod: GO | Performed by: REHABILITATION PRACTITIONER

## 2019-01-31 PROCEDURE — 97166 OT EVAL MOD COMPLEX 45 MIN: CPT | Mod: GO | Performed by: REHABILITATION PRACTITIONER

## 2019-01-31 ASSESSMENT — VISUAL ACUITY
OD: 20/63
OS: 20/80

## 2019-01-31 ASSESSMENT — ACTIVITIES OF DAILY LIVING (ADL): PRIOR_ADL/IADL_STATUS: INDEPENDENT PRIOR TO ONSET

## 2019-01-31 NOTE — PROGRESS NOTES
Pt was seen for evaluation and treatment on 1/31/19 only.  No further OT was scheduled following this date.  Will discharge patient at this time.           01/31/19 0900   Visit Type   Type of Visit Initial       Present Yes   Language German   General Information   Start Of Care Date 01/31/19   Referring Physician Robyn Arredondo MD   Orders Evaluate And Treat As Indicated   Orders Comment Low vision   Date of Order 01/27/18   Medical Diagnosis 3rd cranial nerve palsy   Onset Of Illness/injury Or Date Of Surgery 1/25/19   Surgical/Medical history reviewed Yes   Additional Occupational Profile Info/Pertinent History of Current Problem diagnostic cerebral angiogram 1/27/19.  Pt was admitted to ICU overnight for observation and discharged home the next day.   Pt continues to report diplopia and is keeping his left eye closed most of the time as a result   Prior ADL/IADL status Independent prior to onset   Others present at visit Spouse/significant other  (Allyn)   Patient/family Goals Statement Decrease double vision.     General information comments Pt reports he is able to close lid and open eye slightly and blink eye.  Pt reports he does not have a current pair of glasses.     Social History/Home Environment   Current Community Support Personal care attendant   Patient Role/employment History  Employed   Avocational Pt is currently working and covers 1 eye while working.  Pt works as a supervisor for cleaning.  Pt reports he checks cleaned areas, purchasing and some computer work completing time cards and payments.     Social/Environment Comment Pt has 3 daughters, 27, 25, 12.     Pain Assessment   Pain Reported No   Comments Pt reports fatigue and pain in left side of head if using left eye.     Fall Risk Screen   Fall screen completed by OT   Have you fallen 2 or more times in the past year? No   Have you fallen and had an injury in the past year? No   Fall screen comments Pt reports  decrease in balance with double vision, no difficulty with covered lens.    Cognitive/Behavioral   Communication Intact   Cognitive Status Intact for evaluation process   Behavior Appropriate   Patient/family aware of diagnosis Yes   How well do you understand your eye condition? Well   Vision related restrictions on visiting with family/friends Mild   Reported emotional impact of visual impairment Mild   Adjustment to disability Good   Physical Status/Equipment   Physical Status No problems observed/reported   Visual Report   Functional Complaints Reading;Work related tasks;Writing;Safety in mobility   Visual Complaints Difficulty maintaining focus;Double vision;Light sensitivity   Complaints comments Double vision, eye fatigue   Technology Computer   Equipment comments Pt reports difficulty with reading on computer.  Pt reports he lost his reading glasses 4 months ago.     Lighting and Glare   Are you satisfied with your sunglasses? Yes   Sunglass filter color Gray   Visual Acuity   Acuity right eye 20/63   Acuity left eye 20/80   Acuity both eyes together 20/63 with [employer] symbols screen, without readers.    Contrast Sensitivity   Contrast sensitivity (score/25) 25/25   Functional Reading Screen   Current optical aids used Reading glasses   Clinical Impression, OT Eval   Criteria for Skilled Therapeutic Interventions Met yes;treatment indicated   Therapy  Diagnosis: Impaired ADL/IADL with deficits in Reading based ADL;Written communication;Home management;Financial management;Work performance   Impairment comments Double vision and eye strain impact all ADL/IADL and mobility tasks.   Assessment of Occupational Performance 3-5 Performance Deficits   Identified Performance Deficits Reading based ADL;Written communication;Home management;Financial management;Work performance   Clinical Decision Making (Complexity) Moderate complexity   Clinical Impression Comments Pt demonstrates clinical need for skilled OT services  to improve ADL/IADL independence and safety.  Pt is motivated to improve and has good family support.     OT Visual Rehabilitation Evaluation Plan   Therapy Plan Occupational therapy intervention   Planned Interventions Visual skills training for safety in mobility;Low vision compensatory training for reading;Instruction in environmental adaptations for glare;Optical device/ADL device instruction and training;Low vision compensatory training for written communication;Instruction in environmental adaptations for lighting;Computer modifications   Frequency / Duration 4 visits within 8 weeks   Risks and Benefits of Treatment have been explained. Yes   Patient, Family in agreement with plan of care Yes   GOALS   Goals Near Vision;Visual Field;Environmental Modification;Resource Education   Goal 1 - Near Vision   Goal Description: Near Vision Patient will verbalize awareness of visual field Loss and demonstrate improved use of visual skills/adaptive equipment for increased independence in reading-based activities of daily living, written communication and detail ADL tasks.   Target Date 03/28/19   Goal 2 - Visual field   Goal Description: Visual field Patient will verbalize awareness of visual field loss and demonstrate improved use of visual skills for increased safety/ independence in locating objects/obstacles and in navigation   Target Date 03/28/19   Goal 3 - Environmental modification   Goal Description: Environment modification Patient will demonstrate understanding of the impact of lighting, contrast and glare on ADL activities, in conjunction with environmentally-based ADL modifications   Target Date 03/28/19   Goal 4 - Resource education   Goal Description: Resource education Patient will verbalize awareness of community resources for the following:;Access to large print materials;Access to low vision devices   Target Date 03/28/19   Total Evaluation Time   OT Eval, Moderate Complexity Minutes (29650) 24    Therapy Certification   Certification date from 01/31/19   Certification date to 03/28/19   Medical Diagnosis Diplopia   Certification I certify the need for these services furnished under this plan of treatment and while under my care.  (Physician co-signature of this document indicates review and certification of the therapy plan).

## 2019-02-04 ENCOUNTER — OFFICE VISIT (OUTPATIENT)
Dept: INTERNAL MEDICINE | Facility: CLINIC | Age: 54
End: 2019-02-04
Payer: COMMERCIAL

## 2019-02-04 VITALS
TEMPERATURE: 98.9 F | DIASTOLIC BLOOD PRESSURE: 75 MMHG | HEART RATE: 88 BPM | SYSTOLIC BLOOD PRESSURE: 140 MMHG | OXYGEN SATURATION: 97 % | WEIGHT: 178 LBS | BODY MASS INDEX: 29.62 KG/M2 | RESPIRATION RATE: 16 BRPM

## 2019-02-04 DIAGNOSIS — I10 ESSENTIAL HYPERTENSION: ICD-10-CM

## 2019-02-04 DIAGNOSIS — H49.02 WEAKNESS OF LEFT THIRD CRANIAL NERVE: Primary | ICD-10-CM

## 2019-02-04 PROCEDURE — 99214 OFFICE O/P EST MOD 30 MIN: CPT | Performed by: INTERNAL MEDICINE

## 2019-02-04 RX ORDER — LISINOPRIL 10 MG/1
10 TABLET ORAL DAILY
Qty: 30 TABLET | Refills: 3 | Status: SHIPPED | OUTPATIENT
Start: 2019-02-04 | End: 2019-07-07

## 2019-02-04 NOTE — PATIENT INSTRUCTIONS
- Start taking lisinopril once daily.  (Prescription has been sent to your pharmacy)    - Please follow-up with me in clinic in 2 months.

## 2019-02-04 NOTE — PROGRESS NOTES
SUBJECTIVE:   Brian Espinal is a 53 year old male who presents to clinic today for the following health issues:      Pt is here to discuss lab results.     Pt has concerns about cholesterol and Blood Pressure (Pt is wondering about Blood pressure medication).       Problem list and histories reviewed & adjusted, as indicated.  Additional history: as documented    Patient has developed a palsy of his left 3rd cranial nerve, with resultant dramatic abduction of his left orbit.  Has been following with an ophthalmologist closely.  Initial neuro imaging (CT angio) suggested a possible small blister-type aneurysm at the expected origin of the left posterior communicating artery.  Subsequent catheter directed cerebral angiogram however was normal, and showed no aneurysm.  There is thus felt to be no vascular cause to his symptoms.  He continues to follow with ophthalmology.    Today's blood pressure noted.  Latest lipid panel as follows:  Lab Results   Component Value Date    HDL 33 01/21/2019     01/21/2019    CHOL 175 01/21/2019    TRIG 179 01/21/2019          Reviewed and updated as needed this visit by clinical staff       Reviewed and updated as needed this visit by Provider         ROS:  Constitutional, HEENT, cardiovascular, pulmonary, GI, , musculoskeletal, neuro, skin, endocrine and psych systems are negative, except as otherwise noted.    OBJECTIVE:     /75   Pulse 88   Temp 98.9  F (37.2  C) (Oral)   Resp 16   Wt 80.7 kg (178 lb)   SpO2 97%   BMI 29.62 kg/m    Body mass index is 29.62 kg/m .  GENERAL: healthy, alert and no distress  NECK: no adenopathy, no asymmetry, masses, or scars and thyroid normal to palpation  RESP: lungs clear to auscultation - no rales, rhonchi or wheezes  CV: regular rate and rhythm, normal S1 S2, no S3 or S4, no murmur, click or rub, no peripheral edema and peripheral pulses strong  ABDOMEN: soft, nontender, no hepatosplenomegaly, no masses and bowel sounds  normal  MS: no gross musculoskeletal defects noted, no edema        ASSESSMENT/PLAN:     1. Weakness of left third cranial nerve  Continue to follow with ophthalmology closely.  There appears to be no obvious vascular etiology to this symptom.  When I initially saw the CT angios results, I was prepared to consider statin therapy given apparent atherosclerotic vascular disease.  However with normal catheter directed angiogram, will forego.    2. Essential hypertension  Do feel that it is important to treat his now established hypertension.  Discussed options, will start lisinopril once daily, return in 3 months for recheck.  - lisinopril (PRINIVIL/ZESTRIL) 10 MG tablet; Take 1 tablet (10 mg) by mouth daily  Dispense: 30 tablet; Refill: 3    See Patient Instructions    Imtiaz Martin MD  [facility]

## 2019-02-11 ENCOUNTER — TELEPHONE (OUTPATIENT)
Dept: INTERNAL MEDICINE | Facility: CLINIC | Age: 54
End: 2019-02-11

## 2019-02-11 NOTE — LETTER
[facility]  [address] [address]  [phone]  February 11, 2019    Brian Espinal  [address]  [facility] [address]    Dear Brian,    We care about your health and based on a review of your medical records, recommend the the following, to better manage your health:      You are in particular need of attention regarding:  -Colon Cancer Screening    I am recommending that you:     -schedule a COLONOSCOPY to look for colon cancer (due every 10 years or 5 years in higher risk situations.)        Colon cancer is now the second leading cause of cancer-related deaths in the [place] \[facility] [place] [facility]\"" for both men and women and there are over 130,000 new cases and 50,000 deaths per year from colon cancer.  Colonoscopies can prevent 90-95% of these deaths.  Problem lesions can be removed before they ever become cancer.  This test is not only looking for cancer, but also getting rid of precancerious lesions.    If you are under/uninsured, we recommend you contact the [employer] program. [employer] is a free colorectal cancer screening program that provides colonoscopies for eligible under/uninsured [place] men and women. If you are interested in receiving a free colonoscopy, please call [employer] at [phone] (mention code [employer]) to see if you re eligible.      If you do not wish to do a colonoscopy or cannot afford to do one, at this time, there is another option. It is called a FIT test or Fecal Immunochemical Occult Blood Test (take home stool sample kit).  It does not replace the colonoscopy for colorectal cancer screening, but it can detect hidden bleeding in the lower colon.  It does need to be repeated every year and if a positive result is obtained, you would be referred for a colonoscopy.          If you have completed either one of these tests at another facility, please call with the details of when and where the tests were done  and if they were normal or not. Or have the records sent to our clinic so that we can best coordinate your care.      Here is a list of Health Maintenance topics that are due now or due soon:  Health Maintenance Due   Topic Date Due     Depression Assessment 2 - yearly  02/11/1979     HIV SCREEN (SYSTEM ASSIGNED)  02/11/1985     Diptheria Tetanus Pertussis (DTAP/TDAP/TD) Vaccine (1 - Tdap) 02/11/1992     Colon Cancer Screening - every 10 years.  02/11/2017     Zoster (Shingles) Vaccine (1 of 2) 02/11/2017     Flu Vaccine (1) 09/01/2018       Please call us at [phone] or [phone] (or use [employer]) to address the above recommendations.     Thank you for trusting [facility].  We appreciate the opportunity to serve you and look forward to supporting your healthcare needs in the future.    If you have (or plan to have) any of these tests done at a facility other than a [facility] or a [facility], please have the results from these tests sent to your primary physician at [facility].    Healthy Regards,    Fredi Martin MD/Maribell Hernandez [facility]

## 2019-02-11 NOTE — TELEPHONE ENCOUNTER
Panel Management Review  [facility] Active Problem List   Diagnosis     Third nerve palsy         Patient is due/failing the following:   PREVENTION AND SCREENING       COLONOSCOPY  CARDIOVASCULAR and RENAL  DIABETES  DEPRESSION  ASTHMA  MIGRAINE  COPD  HEART FAILURE  CKD    Action needed:   Please call to schedule a Colonoscopy.    Type of outreach:    Sent letter.    Maribell Hernandez

## 2019-02-18 ENCOUNTER — TELEPHONE (OUTPATIENT)
Dept: INTERNAL MEDICINE | Facility: CLINIC | Age: 54
End: 2019-02-18

## 2019-02-18 NOTE — TELEPHONE ENCOUNTER
Patients wife stopped in to check on status of paperwork that she had dropped off 2/13/19 for patients JUAN LUIS. Does not appear that forms have been entered or received by PCP. Forms located in providers incoming mail slot. Wife states these forms are due to employer by 2/19/19 and she would like to be notified as soon as they are faxed. Please call family once completed.     Our goal is to have forms completed within 72 hours, however some forms may require a visit or additional information.    The form was placed at [facility]    Who is the form from? Patient  Where did the form come from? Patient Dropped off in clinic.  Patient's expectations: Call when completed   When does it need to be completed: 2/19/19     The form was placed in the inbox of: Internal Medicine Providers - Dr. Fredi Martin    Please fax to [phone]  Please call the patient when completed      Call take on 2/18/2019 at 4:38 PM by Maribel Lewis

## 2019-02-19 NOTE — TELEPHONE ENCOUNTER
[place] at [place] [facility].  Please fax today as is due today.  I already contact patient's wife and told her I would complete forms.

## 2019-03-01 ENCOUNTER — OFFICE VISIT (OUTPATIENT)
Dept: OPHTHALMOLOGY | Facility: CLINIC | Age: 54
End: 2019-03-01
Attending: OPHTHALMOLOGY
Payer: COMMERCIAL

## 2019-03-01 DIAGNOSIS — H53.2 DOUBLE VISION: Primary | ICD-10-CM

## 2019-03-01 DIAGNOSIS — H49.02 CRANIAL NERVE III PALSY, LEFT: ICD-10-CM

## 2019-03-01 PROCEDURE — 92060 SENSORIMOTOR EXAMINATION: CPT | Mod: ZF | Performed by: OPHTHALMOLOGY

## 2019-03-01 PROCEDURE — G0463 HOSPITAL OUTPT CLINIC VISIT: HCPCS | Mod: ZF | Performed by: TECHNICIAN/TECHNOLOGIST

## 2019-03-01 ASSESSMENT — CUP TO DISC RATIO
OD_RATIO: 0.35
OS_RATIO: 0.3

## 2019-03-01 ASSESSMENT — EXTERNAL EXAM - LEFT EYE: OS_EXAM: NORMAL

## 2019-03-01 ASSESSMENT — TONOMETRY
OS_IOP_MMHG: 15
OD_IOP_MMHG: 16
IOP_METHOD: ICARE

## 2019-03-01 ASSESSMENT — SLIT LAMP EXAM - LIDS: COMMENTS: NORMAL

## 2019-03-01 ASSESSMENT — VISUAL ACUITY
OD_SC: 20/20
OS_SC+: -3
OS_SC: 20/20
METHOD: SNELLEN - LINEAR

## 2019-03-01 ASSESSMENT — EXTERNAL EXAM - RIGHT EYE: OD_EXAM: NORMAL

## 2019-03-01 NOTE — PROGRESS NOTES
Assessment & Plan     Brian Espinal is a 53 year old male with the following diagnoses:   1. Double vision    2. Cranial nerve III palsy, left       Brian is here for 5 week f/u. Last seen 1/25/19 for left 3rd nerve palsy. CTA initially concern for aneurysm but cerebral angiogram showed left PCOM infundibulum.     Since his last visit, he noticed improvement in his left ptosis and left eye movements. He does notice some double vision but it is not extremely bothersome to him, as he closes the left eye.     His examination shows significant improvement in his left upper eyelid ptosis and well as extraocular motility left eye.     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 This is improving.     return to clinic 2-3 months, earlier if needed.        Complete documentation of historical and exam elements from today's encounter can be found in the full encounter summary report (not reduplicated in this progress note).  I personally obtained the chief complaint(s) and history of present illness.  I confirmed and edited as necessary the review of systems, past medical/surgical history, family history, social history, and examination findings as documented by others; and I examined the patient myself.  I personally reviewed the relevant tests, images, and reports as documented above.  I formulated and edited as necessary the assessment and plan and discussed the findings and management plan with the patient and family. - Aries Kurtz MD

## 2019-05-06 NOTE — ADDENDUM NOTE
Encounter addended by: Lissy Norris, OT on: 5/6/2019 12:45 PM   Actions taken: Sign clinical note, Episode resolved

## 2019-06-07 ENCOUNTER — OFFICE VISIT (OUTPATIENT)
Dept: OPHTHALMOLOGY | Facility: CLINIC | Age: 54
End: 2019-06-07
Attending: OPHTHALMOLOGY
Payer: COMMERCIAL

## 2019-06-07 DIAGNOSIS — H49.02 CRANIAL NERVE III PALSY, LEFT: Primary | ICD-10-CM

## 2019-06-07 DIAGNOSIS — H02.402 PTOSIS OF LEFT EYELID: ICD-10-CM

## 2019-06-07 PROCEDURE — 92015 DETERMINE REFRACTIVE STATE: CPT | Mod: ZF | Performed by: TECHNICIAN/TECHNOLOGIST

## 2019-06-07 PROCEDURE — 92060 SENSORIMOTOR EXAMINATION: CPT | Mod: ZF | Performed by: OPHTHALMOLOGY

## 2019-06-07 PROCEDURE — G0463 HOSPITAL OUTPT CLINIC VISIT: HCPCS | Mod: ZF | Performed by: TECHNICIAN/TECHNOLOGIST

## 2019-06-07 ASSESSMENT — VISUAL ACUITY
OS_SC: 20/30
OS_PH_SC: 20/20
OD_SC: 20/20
OS_SC+: -2
OD_SC+: -1
OS_PH_SC+: -2
METHOD: SNELLEN - LINEAR

## 2019-06-07 ASSESSMENT — REFRACTION_MANIFEST
OS_ADD: +2.50
OD_CYLINDER: +0.75
OD_ADD: +2.50
OS_AXIS: 085
OD_SPHERE: -0.50
OS_CYLINDER: +1.50
OS_SPHERE: -1.25
OD_AXIS: 085

## 2019-06-07 ASSESSMENT — CUP TO DISC RATIO
OS_RATIO: 0.3
OD_RATIO: 0.35

## 2019-06-07 ASSESSMENT — CONF VISUAL FIELD
OS_NORMAL: 1
OD_NORMAL: 1
METHOD: COUNTING FINGERS

## 2019-06-07 ASSESSMENT — TONOMETRY
OD_IOP_MMHG: 17
IOP_METHOD: ICARE
OS_IOP_MMHG: 17

## 2019-06-07 ASSESSMENT — EXTERNAL EXAM - LEFT EYE: OS_EXAM: NORMAL

## 2019-06-07 ASSESSMENT — EXTERNAL EXAM - RIGHT EYE: OD_EXAM: NORMAL

## 2019-06-07 ASSESSMENT — SLIT LAMP EXAM - LIDS
COMMENTS: NORMAL
COMMENTS: NORMAL

## 2019-06-07 NOTE — LETTER
6/7/2019       RE: Brian Morrell  [address]  [place] [facility] [address]     Dear Colleague,    Thank you for referring your patient, Brian Morrell, to the EYE CLINIC at [facility]. Please see a copy of my visit note below.           Assessment & Plan     Brian Espinal is a 53 year old male with the following diagnoses:   1. Cranial nerve III palsy, left    2. Ptosis of left eyelid       Brian is here for 3 month f/u of microvascular left 3rd. Last seen 3/1/2019 for left 3rd nerve palsy. CTA initially concern for aneurysm but cerebral angiogram showed left PCOM infundibulum.     Since his last visit, he noticed complete resolution of his double vision. His left upper eyelid ptosis also completely gone. Examination shows resolution of left upper eyelid ptosis. His ocular alignment dramatically improved. He has an intermittent exotropia at near that is not bothersome.     He last saw his PCP Feb of 2019 and did have high BP, was started on lisinopril. Was suppose to return for f/u in 2months but patient did not. I discussed with him that he needs to call Dr. Martin to make another appointment, as his 3rd nerve palsy is a warning sign that his blood vessel health is not good.     I gave him updated glasses prescription.     return to clinic as needed.      Complete documentation of historical and exam elements from today's encounter can be found in the full encounter summary report (not reduplicated in this progress note).  I personally obtained the chief complaint(s) and history of present illness.  I confirmed and edited as necessary the review of systems, past medical/surgical history, family history, social history, and examination findings as documented by others; and I examined the patient myself.  I personally reviewed the relevant tests, images, and reports as documented above.  I formulated and edited as necessary the assessment and plan and discussed the  findings and management plan with the patient and family. - Aries Kurtz MD        Again, thank you for allowing me to participate in the care of your patient.      Sincerely,    Aries Kurtz MD

## 2019-06-07 NOTE — PROGRESS NOTES
Assessment & Plan     Brian Espinal is a 53 year old male with the following diagnoses:   1. Cranial nerve III palsy, left    2. Ptosis of left eyelid       Brian is here for 3 month f/u of microvascular left 3rd. Last seen 3/1/2019 for left 3rd nerve palsy. CTA initially concern for aneurysm but cerebral angiogram showed left PCOM infundibulum.     Since his last visit, he noticed complete resolution of his double vision. His left upper eyelid ptosis also completely gone. Examination shows resolution of left upper eyelid ptosis. His ocular alignment dramatically improved. He has an intermittent exotropia at near that is not bothersome.     He last saw his PCP Feb of 2019 and did have high BP, was started on lisinopril. Was suppose to return for f/u in 2months but patient did not. I discussed with him that he needs to call Dr. Martin to make another appointment, as his 3rd nerve palsy is a warning sign that his blood vessel health is not good.     I gave him updated glasses prescription.     return to clinic as needed.      Complete documentation of historical and exam elements from today's encounter can be found in the full encounter summary report (not reduplicated in this progress note).  I personally obtained the chief complaint(s) and history of present illness.  I confirmed and edited as necessary the review of systems, past medical/surgical history, family history, social history, and examination findings as documented by others; and I examined the patient myself.  I personally reviewed the relevant tests, images, and reports as documented above.  I formulated and edited as necessary the assessment and plan and discussed the findings and management plan with the patient and family. - Aries Kurtz MD

## 2019-07-07 ENCOUNTER — APPOINTMENT (OUTPATIENT)
Dept: CT IMAGING | Facility: CLINIC | Age: 54
DRG: 442 | End: 2019-07-07
Attending: EMERGENCY MEDICINE
Payer: COMMERCIAL

## 2019-07-07 ENCOUNTER — HOSPITAL ENCOUNTER (INPATIENT)
Facility: CLINIC | Age: 54
LOS: 2 days | Discharge: HOME | DRG: 442 | End: 2019-07-09
Attending: EMERGENCY MEDICINE | Admitting: STUDENT IN AN ORGANIZED HEALTH CARE EDUCATION/TRAINING PROGRAM
Payer: COMMERCIAL

## 2019-07-07 DIAGNOSIS — I81 PORTAL VEIN THROMBOSIS: ICD-10-CM

## 2019-07-07 DIAGNOSIS — K27.9 PEPTIC ULCER: Primary | ICD-10-CM

## 2019-07-07 PROBLEM — H57.89 OCULAR INFLAMMATION: Status: ACTIVE | Noted: 2019-01-17

## 2019-07-07 PROBLEM — H57.12 EYE PAIN, LEFT: Status: ACTIVE | Noted: 2019-01-17

## 2019-07-07 LAB
ALBUMIN SERPL-MCNC: 4.3 G/DL (ref 3.4–5)
ALP SERPL-CCNC: 106 U/L (ref 40–150)
ALT SERPL W P-5'-P-CCNC: 44 U/L (ref 0–70)
ANION GAP SERPL CALCULATED.3IONS-SCNC: 6 MMOL/L (ref 3–14)
AST SERPL W P-5'-P-CCNC: 25 U/L (ref 0–45)
BASOPHILS # BLD AUTO: 0 10E9/L (ref 0–0.2)
BASOPHILS NFR BLD AUTO: 0.2 %
BILIRUB SERPL-MCNC: 0.4 MG/DL (ref 0.2–1.3)
BUN SERPL-MCNC: 23 MG/DL (ref 7–30)
CALCIUM SERPL-MCNC: 8.5 MG/DL (ref 8.5–10.1)
CHLORIDE SERPL-SCNC: 111 MMOL/L (ref 94–109)
CO2 SERPL-SCNC: 27 MMOL/L (ref 20–32)
CREAT SERPL-MCNC: 1.14 MG/DL (ref 0.66–1.25)
DIFFERENTIAL METHOD BLD: NORMAL
EOSINOPHIL # BLD AUTO: 0.2 10E9/L (ref 0–0.7)
EOSINOPHIL NFR BLD AUTO: 2.4 %
ERYTHROCYTE [DISTWIDTH] IN BLOOD BY AUTOMATED COUNT: 12.6 % (ref 10–15)
GFR SERPL CREATININE-BSD FRML MDRD: 73 ML/MIN/{1.73_M2}
GLUCOSE SERPL-MCNC: 112 MG/DL (ref 70–99)
HCT VFR BLD AUTO: 42.1 % (ref 40–53)
HGB BLD-MCNC: 14.8 G/DL (ref 13.3–17.7)
IMM GRANULOCYTES # BLD: 0 10E9/L (ref 0–0.4)
IMM GRANULOCYTES NFR BLD: 0.2 %
INR PPP: 1.01 (ref 0.86–1.14)
LIPASE SERPL-CCNC: 151 U/L (ref 73–393)
LYMPHOCYTES # BLD AUTO: 1.4 10E9/L (ref 0.8–5.3)
LYMPHOCYTES NFR BLD AUTO: 22.7 %
MCH RBC QN AUTO: 31.9 PG (ref 26.5–33)
MCHC RBC AUTO-ENTMCNC: 35.2 G/DL (ref 31.5–36.5)
MCV RBC AUTO: 91 FL (ref 78–100)
MONOCYTES # BLD AUTO: 0.7 10E9/L (ref 0–1.3)
MONOCYTES NFR BLD AUTO: 10.5 %
NEUTROPHILS # BLD AUTO: 4 10E9/L (ref 1.6–8.3)
NEUTROPHILS NFR BLD AUTO: 64 %
NRBC # BLD AUTO: 0 10*3/UL
NRBC BLD AUTO-RTO: 0 /100
PLATELET # BLD AUTO: 182 10E9/L (ref 150–450)
POTASSIUM SERPL-SCNC: 3.8 MMOL/L (ref 3.4–5.3)
PROT SERPL-MCNC: 7.6 G/DL (ref 6.8–8.8)
RBC # BLD AUTO: 4.64 10E12/L (ref 4.4–5.9)
SODIUM SERPL-SCNC: 144 MMOL/L (ref 133–144)
WBC # BLD AUTO: 6.3 10E9/L (ref 4–11)

## 2019-07-07 PROCEDURE — 85610 PROTHROMBIN TIME: CPT | Performed by: EMERGENCY MEDICINE

## 2019-07-07 PROCEDURE — 80053 COMPREHEN METABOLIC PANEL: CPT | Performed by: EMERGENCY MEDICINE

## 2019-07-07 PROCEDURE — 99223 1ST HOSP IP/OBS HIGH 75: CPT | Mod: AI | Performed by: STUDENT IN AN ORGANIZED HEALTH CARE EDUCATION/TRAINING PROGRAM

## 2019-07-07 PROCEDURE — 85025 COMPLETE CBC W/AUTO DIFF WBC: CPT | Performed by: EMERGENCY MEDICINE

## 2019-07-07 PROCEDURE — 96360 HYDRATION IV INFUSION INIT: CPT | Mod: 59

## 2019-07-07 PROCEDURE — 74177 CT ABD & PELVIS W/CONTRAST: CPT

## 2019-07-07 PROCEDURE — 25000125 ZZHC RX 250: Performed by: EMERGENCY MEDICINE

## 2019-07-07 PROCEDURE — 96361 HYDRATE IV INFUSION ADD-ON: CPT

## 2019-07-07 PROCEDURE — 83690 ASSAY OF LIPASE: CPT | Performed by: EMERGENCY MEDICINE

## 2019-07-07 PROCEDURE — 12000000 ZZH R&B MED SURG/OB

## 2019-07-07 PROCEDURE — 99285 EMERGENCY DEPT VISIT HI MDM: CPT | Mod: 25

## 2019-07-07 PROCEDURE — 25000128 H RX IP 250 OP 636: Performed by: EMERGENCY MEDICINE

## 2019-07-07 RX ORDER — LIDOCAINE 40 MG/G
CREAM TOPICAL
Status: DISCONTINUED | OUTPATIENT
Start: 2019-07-07 | End: 2019-07-09 | Stop reason: HOSPADM

## 2019-07-07 RX ORDER — ONDANSETRON 4 MG/1
4 TABLET, ORALLY DISINTEGRATING ORAL EVERY 6 HOURS PRN
Status: DISCONTINUED | OUTPATIENT
Start: 2019-07-07 | End: 2019-07-09 | Stop reason: HOSPADM

## 2019-07-07 RX ORDER — ACETAMINOPHEN 325 MG/1
650 TABLET ORAL EVERY 4 HOURS PRN
Status: DISCONTINUED | OUTPATIENT
Start: 2019-07-07 | End: 2019-07-09 | Stop reason: HOSPADM

## 2019-07-07 RX ORDER — NALOXONE HYDROCHLORIDE 0.4 MG/ML
.1-.4 INJECTION, SOLUTION INTRAMUSCULAR; INTRAVENOUS; SUBCUTANEOUS
Status: DISCONTINUED | OUTPATIENT
Start: 2019-07-07 | End: 2019-07-09 | Stop reason: HOSPADM

## 2019-07-07 RX ORDER — ONDANSETRON 2 MG/ML
4 INJECTION INTRAMUSCULAR; INTRAVENOUS EVERY 6 HOURS PRN
Status: DISCONTINUED | OUTPATIENT
Start: 2019-07-07 | End: 2019-07-09 | Stop reason: HOSPADM

## 2019-07-07 RX ORDER — LISINOPRIL 10 MG/1
10 TABLET ORAL DAILY
Status: CANCELLED | OUTPATIENT
Start: 2019-07-08

## 2019-07-07 RX ORDER — PREDNISOLONE ACETATE 10 MG/ML
SUSPENSION/ DROPS OPHTHALMIC
COMMUNITY
Start: 2019-01-16 | End: 2019-07-07

## 2019-07-07 RX ORDER — ASPIRIN 81 MG/1
81 TABLET ORAL DAILY
Status: DISCONTINUED | OUTPATIENT
Start: 2019-07-08 | End: 2019-07-09 | Stop reason: HOSPADM

## 2019-07-07 RX ORDER — IOPAMIDOL 755 MG/ML
91 INJECTION, SOLUTION INTRAVASCULAR ONCE
Status: COMPLETED | OUTPATIENT
Start: 2019-07-07 | End: 2019-07-07

## 2019-07-07 RX ADMIN — IOPAMIDOL 91 ML: 755 INJECTION, SOLUTION INTRAVENOUS at 19:17

## 2019-07-07 RX ADMIN — SODIUM CHLORIDE 68 ML: 9 INJECTION, SOLUTION INTRAVENOUS at 19:18

## 2019-07-07 RX ADMIN — SODIUM CHLORIDE 1000 ML: 9 INJECTION, SOLUTION INTRAVENOUS at 18:33

## 2019-07-07 ASSESSMENT — ENCOUNTER SYMPTOMS
DIARRHEA: 1
ABDOMINAL PAIN: 1
NAUSEA: 0
VOMITING: 0

## 2019-07-07 ASSESSMENT — ACTIVITIES OF DAILY LIVING (ADL): FALL_HISTORY_WITHIN_LAST_SIX_MONTHS: NO

## 2019-07-07 NOTE — LETTER
[place] [address] [facility]  [address] [place] [address]  [phone]          July 9, 2019    RE:  Brian Morrell                                                                                                                                                       [address]  [place] [facility] [address]            To whom it may concern:    Brian Morrell is under my professional care foe necessary medical services that required he be treated in the hospital from 07/07 - 07/09/2019. He may return to work with the following: The employee is ABLE to return to work 07/10/2019.    Patient's Daughter Ms. Fried was present at bedside during this time and thus please also excuse her from work/school obligations during this time for the invaluable services she provided as his advocate and .     When the patient returns to work, he has no work restrictions.       Sincerely,         Luis Portillo MD

## 2019-07-07 NOTE — ED PROVIDER NOTES
History     Chief Complaint:  Abdominal Pain    The history is provided by the patient. A  was used.      Brian Morrell is a 54 year old male who presents to the emergency department today for evaluation of abdominal pain. For the past 2 weeks the patient has been having upper abdominal pain whenever he eats anything. He was having diarrhea as well, but for about the past 7 days he has been having hard, small pellets of stool. However, drinking coffee or tea he will have diarrhea. He has had no history of this pain. No nausea or vomiting. No fevers.    Allergies:  No Known Drug Allergies    Medications:    Medications reviewed. No pertinent medications.    Past Medical History:    History reviewed. No pertinent medical history.    Past Surgical History:    Appendectomy - Unsure    Family History:    History reviewed. No pertinent family history.    Social History:  The patient was accompanied to the ED by his wife.  Smoking Status: Never Smoker  Smokeless Tobacco: Never Used  Alcohol Use: Positive   Marital Status:       Review of Systems   Gastrointestinal: Positive for abdominal pain and diarrhea. Negative for nausea and vomiting.   All other systems reviewed and are negative.    Physical Exam     Patient Vitals for the past 24 hrs:   BP Temp Temp src Pulse Resp SpO2 Weight   07/07/19 1725 152/79 99  F (37.2  C) Temporal 83 14 96 % 81.6 kg (180 lb)      Physical Exam  General/Appearance: appears stated age, well-groomed, appears comfortable  Eyes: EOMI, no scleral injection, no icterus  ENT: MMM  Neck: supple, nl ROM, no stiffness  Cardiovascular: RRR, nl S1S2, no m/r/g, 2+ pulses in all 4 extremities, cap refill <2sec  Respiratory: CTAB, good air movement throughout, no wheezes/rhonchi/rales, no increased WOB, no retractions  Back: no lesions  GI: abd soft, non-distended, nttp,  no HSM, no rebound, no guarding, nl BS  MSK: OLIVA, good tone, no bony abnormality  Skin: warm and  well-perfused, no rash, no edema, no ecchymosis, nl turgor  Neuro: GCS 15, alert and oriented, no gross focal neuro deficits  Psych: interacts appropriately  Heme: no petechia, no purpura, no active bleeding    Emergency Department Course     Imaging:  Radiology findings were communicated with the patient who voiced understanding of the findings.    CT Abdomen Pelvis w Contrast  1. Cavernous transformation of the portal vein is likely related to  portal vein thrombosis.  2. Multiple areas of portosystemic collateral vein formation,  including gastroesophageal varices, are likely related to portal  hypertension due to the portal vein thrombosis.   Reading per radiology    Laboratory:  Laboratory findings were communicated with the patient who voiced understanding of the findings.    Lipase: 151  INR: 1.01  PTT: Pending  CBC: WBC 6.3, HGB 14.8,   CMP: Chloride 111, Glucose 112, o/w WNL (Creatinine 1.14)    Interventions:  1833 NS, 1 L, IV     Emergency Department Course:    1810 Nursing notes and vitals reviewed.    1814 I performed an exam of the patient as documented above.     1836 IV was inserted and blood was drawn for laboratory testing, results above.     1904 The patient was sent for a CT while in the emergency department, results above.      1943 IV was inserted and blood was drawn for laboratory testing, results above.     1950 I spoke with Dr. Perdue of the Vascular service regarding patient's presentation, findings, and plan of care.     2000 Patient was rechecked and updated. I personally reviewed the lab and imaging results with the patient and answered all related questions prior to admission.    2010 I spoke with Dr. Portillo of the Hospitalist service regarding patient's presentation, findings, and plan of care.     2023 I spoke with Dr. Cardoza of the GI service regarding patient's presentation, findings, and plan of care.     Impression & Plan      Medical Decision Making:  Brian Morrell is a  54 year old male who presents to the emergency department today for evaluation of 2 weeks of intermittent upper abdominal pain.  Pain is present primarily when he eats.  He also notices diarrhea when he eats.  Pain otherwise is fairly mild.  He has some very mild reproducible tenderness to the epigastrium but otherwise unremarkable exam.  LFTs, lipase, white count are all within normal limits.  Notably platelets are not markedly elevated either.  CT, however, shows portal vein thrombosis with some evidence of gastroesophageal varices.  There is no clear cause for why he would have this.  There is no known hypercoagulable state.  I spoke with vascular surgery who recommends getting hold the GI for official recommendations.  Additionally will ask vascular medicine to be involved.  He will be coming into the medicine service for further work-up and management.    Diagnosis:    ICD-10-CM    1. Portal vein thrombosis I81 INR     Disposition:   Admission    Scribe Disclosure:  YARELY Josafat Pranav, am serving as a scribe at 6:12 PM on 7/7/2019 to document services personally performed by Miri Patrick MD based on my observations and the provider's statements to me.     EMERGENCY DEPARTMENT       Miri Patrick MD  07/07/19 7803

## 2019-07-08 LAB
ANION GAP SERPL CALCULATED.3IONS-SCNC: 6 MMOL/L (ref 3–14)
APTT PPP: 33 SEC (ref 22–37)
BUN SERPL-MCNC: 18 MG/DL (ref 7–30)
CALCIUM SERPL-MCNC: 8.8 MG/DL (ref 8.5–10.1)
CHLORIDE SERPL-SCNC: 108 MMOL/L (ref 94–109)
CO2 SERPL-SCNC: 27 MMOL/L (ref 20–32)
CREAT SERPL-MCNC: 0.8 MG/DL (ref 0.66–1.25)
ERYTHROCYTE [DISTWIDTH] IN BLOOD BY AUTOMATED COUNT: 12.7 % (ref 10–15)
GFR SERPL CREATININE-BSD FRML MDRD: >90 ML/MIN/{1.73_M2}
GLUCOSE BLDC GLUCOMTR-MCNC: 162 MG/DL (ref 70–99)
GLUCOSE SERPL-MCNC: 99 MG/DL (ref 70–99)
HCT VFR BLD AUTO: 42.1 % (ref 40–53)
HGB BLD-MCNC: 14.8 G/DL (ref 13.3–17.7)
MCH RBC QN AUTO: 31.7 PG (ref 26.5–33)
MCHC RBC AUTO-ENTMCNC: 35.2 G/DL (ref 31.5–36.5)
MCV RBC AUTO: 90 FL (ref 78–100)
PLATELET # BLD AUTO: 166 10E9/L (ref 150–450)
POTASSIUM SERPL-SCNC: 4 MMOL/L (ref 3.4–5.3)
RBC # BLD AUTO: 4.67 10E12/L (ref 4.4–5.9)
SODIUM SERPL-SCNC: 141 MMOL/L (ref 133–144)
WBC # BLD AUTO: 4.9 10E9/L (ref 4–11)

## 2019-07-08 PROCEDURE — 36415 COLL VENOUS BLD VENIPUNCTURE: CPT | Performed by: STUDENT IN AN ORGANIZED HEALTH CARE EDUCATION/TRAINING PROGRAM

## 2019-07-08 PROCEDURE — 0DB68ZX EXCISION OF STOMACH, VIA NATURAL OR ARTIFICIAL OPENING ENDOSCOPIC, DIAGNOSTIC: ICD-10-PCS | Performed by: INTERNAL MEDICINE

## 2019-07-08 PROCEDURE — 12000000 ZZH R&B MED SURG/OB

## 2019-07-08 PROCEDURE — 99232 SBSQ HOSP IP/OBS MODERATE 35: CPT | Performed by: STUDENT IN AN ORGANIZED HEALTH CARE EDUCATION/TRAINING PROGRAM

## 2019-07-08 PROCEDURE — 36415 COLL VENOUS BLD VENIPUNCTURE: CPT | Performed by: INTERNAL MEDICINE

## 2019-07-08 PROCEDURE — 88305 TISSUE EXAM BY PATHOLOGIST: CPT | Mod: 26 | Performed by: INTERNAL MEDICINE

## 2019-07-08 PROCEDURE — 25000128 H RX IP 250 OP 636: Performed by: INTERNAL MEDICINE

## 2019-07-08 PROCEDURE — 85730 THROMBOPLASTIN TIME PARTIAL: CPT | Performed by: EMERGENCY MEDICINE

## 2019-07-08 PROCEDURE — 43239 EGD BIOPSY SINGLE/MULTIPLE: CPT | Performed by: INTERNAL MEDICINE

## 2019-07-08 PROCEDURE — 85027 COMPLETE CBC AUTOMATED: CPT | Performed by: STUDENT IN AN ORGANIZED HEALTH CARE EDUCATION/TRAINING PROGRAM

## 2019-07-08 PROCEDURE — 88305 TISSUE EXAM BY PATHOLOGIST: CPT | Performed by: INTERNAL MEDICINE

## 2019-07-08 PROCEDURE — C9113 INJ PANTOPRAZOLE SODIUM, VIA: HCPCS | Performed by: STUDENT IN AN ORGANIZED HEALTH CARE EDUCATION/TRAINING PROGRAM

## 2019-07-08 PROCEDURE — 25000128 H RX IP 250 OP 636: Performed by: STUDENT IN AN ORGANIZED HEALTH CARE EDUCATION/TRAINING PROGRAM

## 2019-07-08 PROCEDURE — 81403 MOPATH PROCEDURE LEVEL 4: CPT | Performed by: INTERNAL MEDICINE

## 2019-07-08 PROCEDURE — 00000146 ZZHCL STATISTIC GLUCOSE BY METER IP

## 2019-07-08 PROCEDURE — 80048 BASIC METABOLIC PNL TOTAL CA: CPT | Performed by: STUDENT IN AN ORGANIZED HEALTH CARE EDUCATION/TRAINING PROGRAM

## 2019-07-08 PROCEDURE — 25000125 ZZHC RX 250: Performed by: INTERNAL MEDICINE

## 2019-07-08 PROCEDURE — G0500 MOD SEDAT ENDO SERVICE >5YRS: HCPCS | Performed by: INTERNAL MEDICINE

## 2019-07-08 PROCEDURE — 43237 ENDOSCOPIC US EXAM ESOPH: CPT | Performed by: INTERNAL MEDICINE

## 2019-07-08 PROCEDURE — 99223 1ST HOSP IP/OBS HIGH 75: CPT | Performed by: INTERNAL MEDICINE

## 2019-07-08 RX ORDER — LIDOCAINE 40 MG/G
CREAM TOPICAL
Status: DISCONTINUED | OUTPATIENT
Start: 2019-07-08 | End: 2019-07-08 | Stop reason: HOSPADM

## 2019-07-08 RX ORDER — FENTANYL CITRATE 50 UG/ML
INJECTION, SOLUTION INTRAMUSCULAR; INTRAVENOUS PRN
Status: DISCONTINUED | OUTPATIENT
Start: 2019-07-08 | End: 2019-07-08 | Stop reason: HOSPADM

## 2019-07-08 RX ORDER — FLUMAZENIL 0.1 MG/ML
0.2 INJECTION, SOLUTION INTRAVENOUS
Status: ACTIVE | OUTPATIENT
Start: 2019-07-08 | End: 2019-07-09

## 2019-07-08 RX ORDER — NALOXONE HYDROCHLORIDE 0.4 MG/ML
.1-.4 INJECTION, SOLUTION INTRAMUSCULAR; INTRAVENOUS; SUBCUTANEOUS
Status: DISCONTINUED | OUTPATIENT
Start: 2019-07-08 | End: 2019-07-09

## 2019-07-08 RX ADMIN — PANTOPRAZOLE SODIUM 40 MG: 40 INJECTION, POWDER, FOR SOLUTION INTRAVENOUS at 11:20

## 2019-07-08 RX ADMIN — PANTOPRAZOLE SODIUM 40 MG: 40 INJECTION, POWDER, FOR SOLUTION INTRAVENOUS at 20:13

## 2019-07-08 ASSESSMENT — ACTIVITIES OF DAILY LIVING (ADL)
ADLS_ACUITY_SCORE: 12

## 2019-07-08 NOTE — PROGRESS NOTES
[facility]    Medicine Progress Note - Hospitalist Service       Date of Admission:  7/7/2019  Date of Service: 07/08/2019    Assessment & Plan      Brina Morrell is a 54 year old male admitted on 7/7/2019. He presents for evaluation of abdominal pain.      Abdominal Pain   PUD?   PVT (portal vein thrombosis)   Assessment: presents with 2 weeks of abdominal pain with PO intake.  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 Multiple areas of portosystemic collateral vein formation, including gastroesophageal varices, are likely related to portal hypertension resulting from the portal vein thrombosis. Unclear etiology, no malignancy seen, no obstruction seen, possible hypercoagulable disorder? Lipase and LFTs are wnl.     Plan:   - GI consulted  - Protonix BID  - Vascular medicine consult  - EGD with WUS today  - Hold off anticoagulation for now per vascular surgery    Weakness of left third cranial nerve  Follows with ophthalmology. No obvious vascular etiology to this symptom. normal catheter directed angiogram in 01/2019       Diet: NPO for Medical/Clinical Reasons Except for: Meds, Ice Chips    DVT Prophylaxis: Ambulate every shift  Rouse Catheter: not present  Code Status: Full Code      Disposition Plan   Expected discharge: Tomorrow, recommended to prior living arrangement once GI work-up completed.  Entered: Luis Portillo MD 07/08/2019, 2:00 PM       The patient's care was discussed with the Bedside Nurse, Patient and Patient's Family.    Luis Portillo MD  Hospitalist Service  [facility]    ______________________________________________________________________    Interval History     No acute events overnight  No fevers, no abdominal pain, no nausea.vomiting  No new complaints    Data reviewed today: I reviewed all medications, new labs and imaging results over the last 24 hours. I personally reviewed no images or  EKG's today.    Physical Exam   Vital Signs: Temp: 98.1  F (36.7  C) Temp src: Oral BP: 123/64 Pulse: 73   Resp: 16 SpO2: 95 % O2 Device: None (Room air)    Weight: 180 lbs 0 oz    Constitutional: no apparent distress  Hematologic / Lymphatic: no cervical lymphadenopathy  Respiratory: CTABL  Cardiovascular: RRR with no m/r/g  GI: NT, ND, BS+  Skin: normal skin color, texture, turgor  Musculoskeletal: There is no redness, warmth, or swelling of the joints.  Full range of motion noted.   Neurologic: Awake, alert, oriented to name, place and time.  Cranial nerves II-XII are grossly intact.  Motor is 5 out of 5 bilaterally.  Sensory is intact.   Neuropsychiatric: normal mood and affect       Data   Recent Labs   Lab 07/08/19  0841 07/07/19  1830   WBC 4.9 6.3   HGB 14.8 14.8   MCV 90 91    182   INR  --  1.01    144   POTASSIUM 4.0 3.8   CHLORIDE 108 111*   CO2 27 27   BUN 18 23   CR 0.80 1.14   ANIONGAP 6 6   FATEMEH 8.8 8.5   GLC 99 112*   ALBUMIN  --  4.3   PROTTOTAL  --  7.6   BILITOTAL  --  0.4   ALKPHOS  --  106   ALT  --  44   AST  --  25   LIPASE  --  151     Recent Results (from the past 24 hour(s))   CT Abdomen Pelvis w Contrast    Narrative    CT ABDOMEN PELVIS WITH CONTRAST   7/7/2019 7:26 PM     HISTORY: Mid abdominal pain.    TECHNIQUE: 91 mL Isovue-370 IV were administered. After contrast  administration, volumetric helical sections were acquired from the  lung bases to the ischial tuberosities. Coronal images were also  reconstructed. Radiation dose for this scan was reduced using  automated exposure control, adjustment of the mA and/or kV according  to patient size, or iterative reconstruction technique.    COMPARISON: None.     FINDINGS: No bowel obstruction. No convincing evidence for colitis or  diverticulitis. Unremarkable appendix. No free fluid in the pelvis.  There are multiple areas of portosystemic collateral vein formation  noted, including gastroesophageal varices. There appears to  be  cavernous transformation of the portal vein. The liver, gallbladder,  spleen, adrenal glands, pancreas, and kidneys are otherwise  unremarkable. No hydronephrosis. Circumaortic left renal vein is an  anatomic variant. No intra-abdominal fluid collections. No free  intraperitoneal air. The visualized lung bases are clear.      Impression    IMPRESSION:   1. Cavernous transformation of the portal vein is likely related to  chronic portal vein thrombosis.  2. Multiple areas of portosystemic collateral vein formation,  including gastroesophageal varices, are likely related to portal  hypertension resulting from the portal vein thrombosis.     KACIE ARRINGTON MD     Medications       aspirin  81 mg Oral Daily     pantoprazole (PROTONIX) IV  40 mg Intravenous BID     sodium chloride (PF)  3 mL Intracatheter Q8H

## 2019-07-08 NOTE — CONSULTS
Consult received.  H/O abdominal pain diarrhea.  Portal vein thrombosis aith signs of portal HTN and likely varices. Chronic changes.   Will see patient and full consult in AM.  Possible EGD and EUS tomorrow.  NPO after midnight.    Jac Cardoza GI

## 2019-07-08 NOTE — PLAN OF CARE
A/OX4. Georgian speaking.  requested. No services available. Blue phone in room. Family at bedside. AVSS. On room air, LS clear. NPO since midnight. Denies abdominal pain or nausea this shift. BS active, passing gas. Independent. Plan for EDG today at 1500. Continue to monitor.

## 2019-07-08 NOTE — PHARMACY-ADMISSION MEDICATION HISTORY
Admission medication history interview status for the 7/7/2019  admission is complete. See EPIC admission navigator for prior to admission medications     Medication history source reliability:Good    Actions taken by pharmacist (provider contacted, etc): Reviewed Rx fill history in [employer].      Additional medication history information not noted on PTA med list :   1. Patient states he does not take any medications at home. Medications discontinued:    Aspirin 81 mg daily    Lisinopril 10 mg daily   Prednisolone acetate 1% opth susp     Medication reconciliation/reorder completed by provider prior to medication history? Yes    Time spent in this activity: 2 minutes    Prior to Admission medications    Medication Sig Last Dose Taking? Auth Provider   hypromellose (ARTIFICIAL TEARS) 0.5 % SOLN ophthalmic solution Place 1 drop into both eyes daily as needed for dry eyes PRN Yes Unknown, Entered By History

## 2019-07-08 NOTE — H&P
[facility]    History and Physical - Hospitalist Service       Date of Admission:  7/7/2019    Assessment & Plan   Brian Morrell is a 54 year old male admitted on 7/7/2019. He presents for evaluation of abdominal pain.      Abdominal Pain   PVT (portal vein thrombosis)   Assessment: presents with 2 weeks of abdominal pain with PO intake.  Cavernous transformation of the portal vein is likely related to chronic portal vein thrombosis. Multiple areas of portosystemic collateral vein formation, including gastroesophageal varices, are likely related to portal hypertension resulting from the portal vein thrombosis. Unclear etiology, no malignancy seen, no obstruction seen, possible hypercoagulable disorder? Lipase and LFTs are wnl.     Plan:   - admit to inpatient  - GI consulted  - Vascular medicine consult  - Clears with NPO at midnight  - Hold off anticoagulation for now given varices seen on CT  - IVF    Weakness of left third cranial nerve  Follows with ophthalmology. No obvious vascular etiology to this symptom. normal catheter directed angiogram in 01/2019       Diet: Clears   DVT Prophylaxis: Pneumatic Compression Devices  Rouse Catheter: not present  Code Status: FULL CODE    Disposition Plan   Expected discharge: 2 - 3 days, recommended to prior living arrangement once GI/vascular medicine work up completed.  Entered: Luis Portillo MD 07/07/2019, 8:19 PM     The patient's care was discussed with the Patient and ED Provider.    Luis Portillo MD  [facility]    ______________________________________________________________________    Chief Complaint     Abdominal Pain    History is obtained from the patient    History of Present Illness      Brian Morrell is a 54 year old male with PMH of helicobacter pylori  infection, hemorrhoids, peptic ulcer, who presents for evaluation of abdominal pain.  Patient reports that for the last 2 weeks he has had upper/ epigastric abdominal pain  whenever he ate food.  He reports that at rest he is without any abdominal pain.  He has had some nausea but no vomiting.  He denies any blood in stool, but he reports that for the last 7 days he has been having small pellets of stool.  He denies any weight loss, no fevers or chills.  He denies any chest pain or shortness of breath.  Denies any family history of liver or colon cancer.  He denies any history of previous blood clots, no recent travel. He denies any calf pain or swelling. He denies any palpitations.    Review of Systems      The 10 point Review of Systems is negative other than noted in the HPI or here.     Past Medical History      I have reviewed this patient's medical history and updated it with pertinent information if needed.     Past Medical History:   Diagnosis Date     Ocular inflammation        Past Surgical History   I have reviewed this patient's surgical history and updated it with pertinent information if needed.  Past Surgical History:   Procedure Laterality Date     GI SURGERY      Possible appendectomy at age 9?     IR CAROTID CEREBRAL ANGIOGRAM BILATERAL  1/26/2019     Social History   I have reviewed this patient's social history and updated it with pertinent information if needed.  Social History     Tobacco Use     Smoking status: Never Smoker     Smokeless tobacco: Never Used   Substance Use Topics     Alcohol use: Yes     Comment: 3 drinks per month     Drug use: No       Family History   I have reviewed this patient's family history and updated it with pertinent information if needed.   Family History   Problem Relation Age of Onset     Arthritis Mother      No Known Problems Father      Prior to Admission Medications   Prior to Admission Medications   Prescriptions Last Dose Informant Patient Reported? Taking?   lisinopril (PRINIVIL/ZESTRIL) 10 MG tablet   No No   Sig: Take 1 tablet (10 mg) by mouth daily      Facility-Administered Medications: None     Allergies   Allergies    Allergen Reactions     No Known Allergies        Physical Exam   Vital Signs: Temp: 99  F (37.2  C) Temp src: Temporal BP: 152/79 Pulse: 83   Resp: 14 SpO2: 96 %      Weight: 180 lbs 0 oz    Constitutional: no apparent distress  Eyes: Lids and lashes normal, pupils equal, round and reactive to light, extra ocular muscles intact, sclera clear, conjunctiva normal  ENT: Normocephalic, without obvious abnormality, atraumatic, sinuses nontender on palpation, external ears without lesions  Hematologic / Lymphatic: no cervical lymphadenopathy  Respiratory: CTABL  Cardiovascular: RRR with no m/r/g  GI: NT, ND, BS+  Skin: normal skin color, texture, turgor  Musculoskeletal: There is no redness, warmth, or swelling of the joints.  Full range of motion noted.   Neurologic: Awake, alert, oriented to name, place and time.  Cranial nerves II-XII are grossly intact.  Motor is 5 out of 5 bilaterally.  Sensory is intact.   Neuropsychiatric: normal mood and affect    Data   Data reviewed today: I reviewed all medications, new labs and imaging results over the last 24 hours. I personally reviewed the abdominal CT image(s) showing see below.    IMPRESSION:   1. Cavernous transformation of the portal vein is likely related to  portal vein thrombosis.  2. Multiple areas of portosystemic collateral vein formation,  including gastroesophageal varices, are likely related to portal  hypertension due to the portal vein thrombosis.     Recent Labs   Lab 07/07/19  1830   WBC 6.3   HGB 14.8   MCV 91      INR 1.01      POTASSIUM 3.8   CHLORIDE 111*   CO2 27   BUN 23   CR 1.14   ANIONGAP 6   FATEMEH 8.5   *   ALBUMIN 4.3   PROTTOTAL 7.6   BILITOTAL 0.4   ALKPHOS 106   ALT 44   AST 25   LIPASE 151     Recent Results (from the past 24 hour(s))   CT Abdomen Pelvis w Contrast    Narrative    CT ABDOMEN PELVIS WITH CONTRAST   7/7/2019 7:26 PM     HISTORY: Mid abdominal pain.    TECHNIQUE: 91 mL Isovue-370 IV were administered. After  contrast  administration, volumetric helical sections were acquired from the  lung bases to the ischial tuberosities. Coronal images were also  reconstructed. Radiation dose for this scan was reduced using  automated exposure control, adjustment of the mA and/or kV according  to patient size, or iterative reconstruction technique.    COMPARISON: None.     FINDINGS: No bowel obstruction. No convincing evidence for colitis or  diverticulitis. Unremarkable appendix. No free fluid in the pelvis.  There are multiple areas of portosystemic collateral vein formation  noted, including gastroesophageal varices. There appears to be  cavernous transformation of the portal vein. The liver, gallbladder,  spleen, adrenal glands, pancreas, and kidneys are otherwise  unremarkable. No hydronephrosis. Circumaortic left renal vein is an  anatomic variant. No intra-abdominal fluid collections. No free  intraperitoneal air. The visualized lung bases are clear.      Impression    IMPRESSION:   1. Cavernous transformation of the portal vein is likely related to  portal vein thrombosis.  2. Multiple areas of portosystemic collateral vein formation,  including gastroesophageal varices, are likely related to portal  hypertension due to the portal vein thrombosis.

## 2019-07-08 NOTE — ED NOTES
"[facility]  ED Nurse Handoff Report    ED Chief complaint: Abdominal Pain      ED Diagnosis:   Final diagnoses:   Portal vein thrombosis       Code Status: Full Code    Allergies:   Allergies   Allergen Reactions     No Known Allergies        Activity level - Baseline/Home:  Independent  Activity Level - Current:   Independent    Patient's Preferred language: [place]   Needed?: Yes    Isolation: No  Infection: Not Applicable  Bariatric?: No    Vital Signs:   Vitals:    07/07/19 1725   BP: 152/79   Pulse: 83   Resp: 14   Temp: 99  F (37.2  C)   TempSrc: Temporal   SpO2: 96%   Weight: 81.6 kg (180 lb)       Cardiac Rhythm: ,        Pain level: 0-10 Pain Scale: 7    Is this patient confused?: No   Does this patient have a guardian?  NO         If yes, is there guardianship documents in the Epic \"Code/ACP\" activity?  No         Guardian Notified?  N/A  [place] - Suicide Severity Rating Scale Completed?  Yes  If yes, what color did the patient score?  White    Patient Report: Initial Complaint: Pt presents to ED with abdominal pain.  Pt has had upper abdominal pain x2 weeks that occurs with eating.  Pt reported having diarrhea, and constipation prior to that for 7 days.  Ct results show portal vein thrombosis.  Pt has not needed pain medication or nausea medication while in ED. 1L IVF given to pt.  Pt Reports pain with urination at times.  AOx3, indpendent with ambulation.    Focused Assessment: Upper abdominal pain.  Tests Performed:   Results for orders placed or performed during the hospital encounter of 07/07/19   CT Abdomen Pelvis w Contrast    Narrative    CT ABDOMEN PELVIS WITH CONTRAST   7/7/2019 7:26 PM     HISTORY: Mid abdominal pain.    TECHNIQUE: 91 mL Isovue-370 IV were administered. After contrast  administration, volumetric helical sections were acquired from the  lung bases to the ischial tuberosities. Coronal images were also  reconstructed. Radiation dose for this scan was " reduced using  automated exposure control, adjustment of the mA and/or kV according  to patient size, or iterative reconstruction technique.    COMPARISON: None.     FINDINGS: No bowel obstruction. No convincing evidence for colitis or  diverticulitis. Unremarkable appendix. No free fluid in the pelvis.  There are multiple areas of portosystemic collateral vein formation  noted, including gastroesophageal varices. There appears to be  cavernous transformation of the portal vein. The liver, gallbladder,  spleen, adrenal glands, pancreas, and kidneys are otherwise  unremarkable. No hydronephrosis. Circumaortic left renal vein is an  anatomic variant. No intra-abdominal fluid collections. No free  intraperitoneal air. The visualized lung bases are clear.      Impression    IMPRESSION:   1. Cavernous transformation of the portal vein is likely related to  chronic portal vein thrombosis.  2. Multiple areas of portosystemic collateral vein formation,  including gastroesophageal varices, are likely related to portal  hypertension resulting from the portal vein thrombosis.     KACIE ARRINGTON MD   CBC with platelets differential   Result Value Ref Range    WBC 6.3 4.0 - 11.0 10e9/L    RBC Count 4.64 4.4 - 5.9 10e12/L    Hemoglobin 14.8 13.3 - 17.7 g/dL    Hematocrit 42.1 40.0 - 53.0 %    MCV 91 78 - 100 fl    MCH 31.9 26.5 - 33.0 pg    MCHC 35.2 31.5 - 36.5 g/dL    RDW 12.6 10.0 - 15.0 %    Platelet Count 182 150 - 450 10e9/L    Diff Method Automated Method     % Neutrophils 64.0 %    % Lymphocytes 22.7 %    % Monocytes 10.5 %    % Eosinophils 2.4 %    % Basophils 0.2 %    % Immature Granulocytes 0.2 %    Nucleated RBCs 0 0 /100    Absolute Neutrophil 4.0 1.6 - 8.3 10e9/L    Absolute Lymphocytes 1.4 0.8 - 5.3 10e9/L    Absolute Monocytes 0.7 0.0 - 1.3 10e9/L    Absolute Eosinophils 0.2 0.0 - 0.7 10e9/L    Absolute Basophils 0.0 0.0 - 0.2 10e9/L    Abs Immature Granulocytes 0.0 0 - 0.4 10e9/L    Absolute Nucleated RBC 0.0     Comprehensive metabolic panel   Result Value Ref Range    Sodium 144 133 - 144 mmol/L    Potassium 3.8 3.4 - 5.3 mmol/L    Chloride 111 (H) 94 - 109 mmol/L    Carbon Dioxide 27 20 - 32 mmol/L    Anion Gap 6 3 - 14 mmol/L    Glucose 112 (H) 70 - 99 mg/dL    Urea Nitrogen 23 7 - 30 mg/dL    Creatinine 1.14 0.66 - 1.25 mg/dL    GFR Estimate 73 >60 mL/min/[1.73_m2]    GFR Estimate If Black 85 >60 mL/min/[1.73_m2]    Calcium 8.5 8.5 - 10.1 mg/dL    Bilirubin Total 0.4 0.2 - 1.3 mg/dL    Albumin 4.3 3.4 - 5.0 g/dL    Protein Total 7.6 6.8 - 8.8 g/dL    Alkaline Phosphatase 106 40 - 150 U/L    ALT 44 0 - 70 U/L    AST 25 0 - 45 U/L   Lipase   Result Value Ref Range    Lipase 151 73 - 393 U/L   INR   Result Value Ref Range    INR 1.01 0.86 - 1.14       Abnormal Results: see above  Treatments provided: IVF    Family Comments: Wife at bedside.    OBS brochure/video discussed/provided to patient/family: Yes              Name of person given brochure if not patient: n/a              Relationship to patient: n/a    ED Medications:   Medications   0.9% sodium chloride BOLUS (1,000 mLs Intravenous New Bag 7/7/19 1833)   100mL saline flush (68 mLs Intravenous Given 7/7/19 1918)   iopamidol (ISOVUE-370) solution 91 mL (91 mLs Intravenous Given 7/7/19 1917)       Drips infusing?:  No    For the majority of the shift this patient was Green.   Interventions performed were TLC.    Severe Sepsis OR Septic Shock Diagnosis Present: No    To be done/followed up on inpatient unit:  n/a    ED NURSE PHONE NUMBER: [phone]

## 2019-07-08 NOTE — PLAN OF CARE
A&Ox4. Sami speaking. Understands very little english. Wife helps translate, blue  phone also at bedside. VSS on RA. Ind in room. Denies pain at this time. Denies nausea. NPO midnight for possible EGD tomorrow.

## 2019-07-08 NOTE — PLAN OF CARE
A/Ox4. AVSS on Ra. LS-clear, Bs+, flatus+. Patient has intermittent pain in abdomen in umbilical region. Plan is for EGD today.

## 2019-07-08 NOTE — PROGRESS NOTES
EGD and endoscopic ultrasound was done.  EGD findings are consistent with gastritis multiple erosions in the antrum of stomach and body of stomach.  Endoscopic ultrasound findings are consistent consistent with somewhat atrophic lobulated irregular pancreas along with extensive collateral vessels in the gina hepatis and around the pancreas area biliary system is otherwise normal liver is otherwise normal.  Findings are quite suspicious for possible previous episode of pancreatitis leading to chronic pancreatitis changes along with portal hypertension and portal vein thrombosis with extensive collaterals.  Plan: Start on soft diet continue on Protonix 40 mg daily.  Await biopsy result for H. pylori.  Regarding his history of loose stools patient will need further evaluation with colonoscopy and I will recommend stool studies to check for fecal elastase to rule out pancreatic insufficiency.    Jac Cardoza GI

## 2019-07-08 NOTE — CONSULTS
Consult Date:  07/08/2019      VASCULAR MEDICINE CONSULTATION      REQUESTING PHYSICIAN:  Luis Portillo MD      REASON FOR CONSULTATION:  Epigastric abdominal pain associated with now discovered chronic-appearing portal vein in someone not previously known to be cirrhotic with low lifestyle risk factors for cirrhosis.      IMPRESSION:  Chronic appearing portal vein thrombosis      The patient presents with a 2-3 weeks' history of mild epigastric abdominal pain.  He presented to the emergency room where a CT scan was performed.  This revealed cavernous transformation of the portal vein, likely due to chronic portal vein thrombosis, also seen were multiple areas of portosystemic collateral vein formation including gastroesophageal varices.  He is not currently experiencing acute mesenteric ischemia.  He has noted some diarrhea in the course of the last several weeks.  No masses or malignancy were seen on CT scan.  He denies fevers or chills.  He denies pruritus present.  My recommendations are as follows:  anticoagulation for the current time.   (a)  Hold on anticoagulation for the current time.   (b)  Await EGD and endoscopic ultrasound to further characterize grade of esophageal varices.   (c)  Consider liver biopsy if GI would concur to rule out presence of cirrhosis.   (d)  Readdress anticoagulation after the above procedures have been performed.  If anticoagulation is indicated due to a high risk of progressive thrombosis and minimal risk of bleeding noted on above procedures, I would favor initial treatment with Lovenox as opposed to Lovenox bridging with warfarin due to the lower half the time of Lovenox.  Duration of anticoagulation would be 3-6 months.     (e)  Hold on full hypercoagulable workup disorder at the present time until after above procedures can be undertaken and a discussion with Dr. Cardoza can occur regarding the presence versus absence of cirrhosis.  Nonetheless, obtain a JAK2 mutation analysis  on the patient at the present time despite the fact that the patient does not have thrombocytosis as myeloproliferative disorders are a common cause of mesenteric venous thrombosis.     (f)  Hold on surgical evaluation at present as the patient has a soft, nonrigid abdomen.      CHIEF COMPLAINT:  Abdominal pain.      HISTORY OF PRESENT ILLNESS:  The patient is a 54-year-old non-English speaking [place] male in the [place] for last 28 years.  He has a previous medical history of H. pylori infection, hemorrhoids, as well as peptic ulcer disease.  He presents for evaluation of abdominal pain.  For the last 2-3 weeks, he has had upper epigastric abdominal pain with any oral intake.  When not in an immediately postprandial state, he denies abdominal pain.  He has had some nausea, had some diarrhea.  He denies melena.  He denies pruritus.  He has not received multiple tattoos.  He is not engaged in risky behavior for hepatitis.  He specifically denies IV drug abuse or prostitution      REVIEW OF SYSTEMS:  Presently denies chest pain, shortness of breath, nausea, vomiting, diarrhea or abdominal pain.  The remainder of his 14-point review of systems is within normal limits.      PAST MEDICAL HISTORY:     1.  Ocular inflammation.   2.  Hypertension.      PREVIOUS SURGICAL HISTORY:   1.  Possible appendectomy at age 9 with bowel resection of a short segment of bowel and primary anastomosis, according to the patient.   2.  Cerebral carotid angiogram, 01/26/2019, to investigate ocular symptoms as above        SOCIAL HISTORY:  The patient is a lifelong nonsmoker.  He is .  He has children.  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      FAMILY HISTORY:  Notable for only arthritis in his mother.  There is no history of cirrhosis or mesenteric or unprovoked venous thromboembolism.        MEDICATION:  Lisinopril 10 mg daily.      ALLERGIES:  NO KNOWN DRUG ALLERGIES.      PHYSICAL EXAMINATION:   GENERAL:   Currently, the patient appears comfortable at rest in his hospital bed.   VITAL SIGNS:  Blood pressure 121/65, temperature 98.1 Fahrenheit, pulse 58 and regular, respiratory rate 16, pulse ox 96% on room air.   HEENT:  Oropharynx within normal limits.   NECK:  No JVD, thyromegaly, lymphadenopathy.   LUNGS:  Clear to auscultation bilaterally without rales, wheezes or rhonchi.   ABDOMEN:  Decreased breath sounds.  Soft, nondistended, nontender.  No rigid abdomen.   EXTREMITIES:  Without cyanosis, clubbing or edema.   DERMATOLOGIC:  Absence of spider angiomata.  The patient has no Kayser-Fleischer rings.   NEUROLOGIC:  Nonfocal.      LABORATORY DATA:  Normal CBC with platelets and differential.  Normal basic metabolic panel normal.  Normal INR, normal lipase.  Comprehensive metabolic panel reveals glucose 112, chloride of 111.  Remainder of electrolytes are normal.  LFTs within normal limits.  CT scan reveals cavernous transformation of the portal vein, multiple areas of portosystemic collateral vein formation including gastroesophageal varices.      ASSESSMENT AND PLAN:  Please see recommendations as above.         RORO CAMP MD             D: 2019   T: 2019   MT: GASPER      Name:     PINA NEVES   MRN:      [id number]        Account:       [id number]   :      1965           Consult Date:  2019      Document: [id number]       cc: VIPIN Martin MD

## 2019-07-08 NOTE — CONSULTS
[facility]  Gastroenterology Consultation         Brian Morrell  [address]  [facility] [address]  54 year old male    Admission Date/Time: 7/7/2019  Primary Care Provider: Imtiaz Martin  Referring / Attending Physician:  Dr. Luis Portillo    We were asked to see the patient in consultation by Dr. Luis Portillo for evaluation of portal vein thrombosis and esophageal varicse.      CC: abdominal pain    HPI:  Brian Morrell is a 54 year old male who has a past medical history of Helicobacter pylori infection hemorrhoids, peptic ulcer, who complains of abdominal pain for past 2 weeks that is present with eating in RUQ and epigastric area. He states that otherwise has no abdominal pain. He denies heartburn, bloody stools, melena. States has had fluctuation in stools between diarrhea and constipation over lat month.  Has had some nausea without vomiting. Reports significant use of alcohol in past but now drinks 2 alcoholic beverages a week. Denies trauma to abdomen. Also reports prior surgery to abdomen at age 9 but unsure what he had done. Denies history of stroke or blood clot and is in otherwise good health. CT scan was ordered and revealed cavernous transformation of the portal vein is likely related to chronic portal vein thrombosis. Multiple areas of portosystemic collateral vein formation, including gastroesophageal varices, are likely related to portal hypertension resulting from the portal vein thrombosis. Labs are unremarkable.    ROS: A comprehensive ten point review of systems was negative aside from those in mentioned in the HPI.      PAST MED HX:  I have reviewed this patient's medical history and updated it with pertinent information if needed.   Past Medical History:   Diagnosis Date     External bleeding hemorrhoids      Ocular inflammation        MEDICATIONS:   Prior to Admission Medications   Prescriptions Last Dose Informant Patient Reported? Taking?   hypromellose  (ARTIFICIAL TEARS) 0.5 % SOLN ophthalmic solution PRN  Yes Yes   Sig: Place 1 drop into both eyes daily as needed for dry eyes      Facility-Administered Medications: None       ALLERGIES:   Allergies   Allergen Reactions     No Known Allergies        SOCIAL HISTORY:  Social History     Tobacco Use     Smoking status: Never Smoker     Smokeless tobacco: Never Used   Substance Use Topics     Alcohol use: Yes     Comment: 3 drinks per month     Drug use: No       FAMILY HISTORY:  Family History   Problem Relation Age of Onset     Arthritis Mother      No Known Problems Father        PHYSICAL EXAM:   General  Alert, oriented and comfortable  Vital Signs with Ranges  Temp: 98.1  F (36.7  C) Temp src: Oral BP: 121/65 Pulse: 58   Resp: 16 SpO2: 96 % O2 Device: None (Room air)    No intake/output data recorded.    Constitutional: healthy, alert and no distress   Cardiovascular: negative, PMI normal. No lifts, heaves, or thrills. RRR. No murmurs, clicks gallops or rub  Respiratory: negative, Percussion normal. Good diaphragmatic excursion. Lungs clear  Head: Normocephalic. No masses, lesions, tenderness or abnormalities  Neck: Neck supple. No adenopathy. Thyroid symmetric, normal size,, Carotids without bruits.  Abdomen: Abdomen soft, non-tender. BS normal. No masses, organomegaly          ADDITIONAL COMMENTS:   I reviewed the patient's new clinical lab test results.   Recent Labs   Lab Test 07/08/19  0841 07/07/19 1830 01/27/19  0657   WBC 4.9 6.3 5.8   HGB 14.8 14.8 14.7   MCV 90 91 93    182 182   INR  --  1.01  --      Recent Labs   Lab Test 07/08/19  0841 07/07/19  1830 01/27/19  0657   POTASSIUM 4.0 3.8 4.0   CHLORIDE 108 111* 109   CO2 27 27 23   BUN 18 23 21   ANIONGAP 6 6 8     Recent Labs   Lab Test 07/07/19  1830 01/21/19  1115   ALBUMIN 4.3  --    BILITOTAL 0.4  --    ALT 44  --    AST 25  --    PROTEIN  --  Negative   LIPASE 151  --        I reviewed the patient's new imaging  results.        CONSULTATION ASSESSMENT AND PLAN:    Principal Problem:    PVT (portal vein thrombosis)    Assessment: Brian is a pleasant 54 year old male with complaints of epigastric and LUQ with eating over last 2 weeks that resolves after one hour and has no abdominal pain at any other time.  Has had mild nausea without vomiting. Stools have fluctuated with diarrhea and constipation and have been non bloody and without melena.  CT scan was ordered and revealed cavernous transformation of the portal vein is likely related to chronic portal vein thrombosis. Multiple areas of portosystemic collateral vein formation, including gastroesophageal varices, are likely related to portal hypertension resulting from the portal vein thrombosis. Unclear etiology, no malignancy seen, no obstruction seen, possible hypercoagulable disorder.  Has had unremarkable labs. Hematology has been consulted      Plan: There is significant concern for portal vein thrombosis from anatomical cause vs neoplastic vs hypercoagulable state. Abdominal pain may also be related to PUD. Will recommend patient have an EGD with endoscopic ultrasound. Keep NPO and continue with IV pantoprazole. Will await hematology consult.           [facility].  Office: [phone]  Cell : [phone] (Dr. Cardoza)  Cell: [phone] (Helen Dorantes PA-C)

## 2019-07-08 NOTE — PROGRESS NOTES
RECEIVING UNIT ED HANDOFF REVIEW    ED Nurse Handoff Report was reviewed by: Mckenzie Young on July 7, 2019 at 9:13 PM

## 2019-07-09 VITALS
WEIGHT: 180 LBS | DIASTOLIC BLOOD PRESSURE: 61 MMHG | RESPIRATION RATE: 16 BRPM | TEMPERATURE: 97.9 F | SYSTOLIC BLOOD PRESSURE: 134 MMHG | BODY MASS INDEX: 29.95 KG/M2 | OXYGEN SATURATION: 95 % | HEART RATE: 58 BPM

## 2019-07-09 PROCEDURE — 99356 ZZC PROLONGED SERV,INPATIENT,1ST HR: CPT | Performed by: INTERNAL MEDICINE

## 2019-07-09 PROCEDURE — 99233 SBSQ HOSP IP/OBS HIGH 50: CPT | Performed by: INTERNAL MEDICINE

## 2019-07-09 PROCEDURE — 99239 HOSP IP/OBS DSCHRG MGMT >30: CPT | Performed by: STUDENT IN AN ORGANIZED HEALTH CARE EDUCATION/TRAINING PROGRAM

## 2019-07-09 PROCEDURE — 25000132 ZZH RX MED GY IP 250 OP 250 PS 637: Performed by: STUDENT IN AN ORGANIZED HEALTH CARE EDUCATION/TRAINING PROGRAM

## 2019-07-09 PROCEDURE — 25000132 ZZH RX MED GY IP 250 OP 250 PS 637

## 2019-07-09 RX ORDER — PANTOPRAZOLE SODIUM 40 MG/1
40 TABLET, DELAYED RELEASE ORAL
Status: DISCONTINUED | OUTPATIENT
Start: 2019-07-09 | End: 2019-07-09 | Stop reason: HOSPADM

## 2019-07-09 RX ORDER — PANTOPRAZOLE SODIUM 40 MG/1
40 TABLET, DELAYED RELEASE ORAL DAILY
Qty: 30 TABLET | Refills: 0 | Status: SHIPPED | OUTPATIENT
Start: 2019-07-09

## 2019-07-09 RX ADMIN — ASPIRIN 81 MG: 81 TABLET, COATED ORAL at 09:54

## 2019-07-09 RX ADMIN — PANTOPRAZOLE SODIUM 40 MG: 40 TABLET, DELAYED RELEASE ORAL at 09:54

## 2019-07-09 ASSESSMENT — ACTIVITIES OF DAILY LIVING (ADL)
ADLS_ACUITY_SCORE: 12

## 2019-07-09 NOTE — PROGRESS NOTES
[facility]    Vascular Medicine Progress Note    Date of Service (when I saw the patient): 07/09/2019     Assessment & Plan      Brian Morrell is a 54 year old male who was admitted on 7/7/2019.    1. Chronic appearing portal vein thrombosis ( per GI , Findings are quite suspicious for possible previous episode of pancreatitis leading to chronic pancreatitis changes along with portal hypertension and portal vein thrombosis with extensive collaterals.)    2.chronic pancreatitis, extensive varices noted in the portahepatris area on  EUS 7/8/19    3.EGD 7/9/19 consistent with gastritis multiple erosions in the antrum of stomach and body of stomach:    3. HX of chronic diarrhea, ? pancreatic insufficiency     4. Previous Hx of ETOH:    He was seen and evaluated with official interpretor at bed side this am.    Reviewed GI evaluation and discussed with Dr. Cardoza this morning.    Reviewed imaging studies.    Will hold off anticoagulation for now until completion of GI work up , his portal vein thrombosis is likely chronic and unknown  Duration.     OK to continue baby aspirin daily   Continue PPI    After GI work up may consider 3 months of anticoagulation if no contraindication with warfarin .        Follow up with Dr. Cardoza first and complete GI work up then see  at [facility] in 2 -3 weeks or so   ( [phone])     Vascular Medicine will sign off please call us with any questions    Patient care time spent 65 minutes today.    Celina Alejo MD,[facility]  Vascular Medicine    Interval History   Doing well, underwent EGD, reviewed report and discussed with Dr. Cardoza   Family  at bedside  See an evaluated with interpretor today.      Physical Exam   Temp: 97.9  F (36.6  C) Temp src: Oral BP: 134/61 Pulse: 58 Heart Rate: 72 Resp: 16 SpO2: 95 % O2 Device: None (Room air)    Vitals:    07/07/19 1725   Weight: 81.6 kg (180 lb)     Vital Signs with Ranges  Temp:  [97.9  F (36.6   C)-98.2  F (36.8  C)] 97.9  F (36.6  C)  Pulse:  [58-91] 58  Heart Rate:  [68-89] 72  Resp:  [11-35] 16  BP: (102-149)/(56-97) 134/61  SpO2:  [69 %-98 %] 95 %  I/O last 3 completed shifts:  In: 123 [P.O.:120; I.V.:3]  Out: -     Constitutional: Awake, alert, cooperative, no apparent distress, and appears stated age.  Eyes: Lids and lashes normal, pupils equal, round and reactive to light, extra ocular muscles intact, sclera clear, conjunctiva normal.  ENT: Normocephalic, without obvious abnormality  Respiratory: No increased work of breathing, good air exchange, clear to auscultation bilaterally, no crackles or wheezing.  Cardiovascular: Normal apical impulse, regular rate and rhythm, normal S1 and S2, no S3 or S4, and no murmur noted.  GI:  normal bowel sounds, soft, non-distended, non-tender, no masses palpated, no hepatosplenomegaly.  Lymph/Hematologic: No cervical lymphadenopathy and no supraclavicular lymphadenopathy.  Genitourinary:  NAD  Skin: No bruising or bleeding, normal skin color, texture, turgor, no redness, warmth, or swelling, no rashes, no lesions,  nails normal without discoloration or clubbing and no jaundice.  Musculoskeletal: There is no redness, warmth, or swelling of the joints.  Full range of motion noted.  Motor strength is 5 out of 5 all extremities bilaterally.  Tone is normal.  Neurologic: Awake, alert, oriented to name, place and time.  Cranial nerves II-XII are grossly intact.  Motor is 5 out of 5 bilaterally.   Neuropsychiatric: Calm, normal eye contact, alert, normal affect, oriented to self, place, time and situation, memory for past and recent events intact and thought process normal.  Pulses: palpable pulses    Medications     - MEDICATION INSTRUCTIONS -         aspirin  81 mg Oral Daily     pantoprazole  40 mg Oral BID AC     sodium chloride (PF)  3 mL Intracatheter Q8H       Data   Recent Labs   Lab 07/08/19  0841 07/07/19  1830   WBC 4.9 6.3   HGB 14.8 14.8   MCV 90 91   PLT  166 182   INR  --  1.01    144   POTASSIUM 4.0 3.8   CHLORIDE 108 111*   CO2 27 27   BUN 18 23   CR 0.80 1.14   ANIONGAP 6 6   FATEMEH 8.8 8.5   GLC 99 112*   ALBUMIN  --  4.3   PROTTOTAL  --  7.6   BILITOTAL  --  0.4   ALKPHOS  --  106   ALT  --  44   AST  --  25

## 2019-07-09 NOTE — PROGRESS NOTES
Discharge and follow-up instructions provided. Patient verifies understanding. Daughter present to translate as needed. Discharge medication received. Patient discharging home accompanied by daughter.

## 2019-07-09 NOTE — PLAN OF CARE
Pt is A+Ox4, VSS. Up independently. Reports mild pain, declines ice and pain medication. Voiding without difficulty. Lung sounds clear. Low fiber diet.

## 2019-07-09 NOTE — PLAN OF CARE
Patient returned from EGD at 1730 with  (Icelandic speaking). VSS on RA, A/O x4. Lung sounds clear, BS active. Patient denied pain. Voiding adequately. Up independently. Tolerating low fiber diet well. Spouse at bedside.

## 2019-07-09 NOTE — PROGRESS NOTES
[facility]  Gastroenterology Progress Note     Brian Morrell MRN# [id number]   YOB: 1965 Age: 54 year old          Assessment and Plan:     PVT (portal vein thrombosis)- patient endoscopic ultrasound and revealed chronic pancreatitis with diffuse abnormal echotexture to visualized portion of the liver and extensvie varices in the portahepatis. Patient appears clinically well. I have recommended a low fat diet. Ok with [facility] to discharge patient and follow up in 1-2 weeks. Will recommend further workup for pancreatic insufficiency.            Portal vein thrombosis  Peptic ulcer      Interval History:   no new complaints, doing well, denies chest pain, denies shortness of breath, denies abdominal pain, alert, oriented to person, place and time and doing well; no cp, sob, n/v/d, or abd pain.              Review of Systems:   C: NEGATIVE for fever, chills, change in weight  E/M: NEGATIVEfor ear, mouth and throat problems  R: NEGATIVE for significant cough or SOB  CV: NEGATIVE for chest pain, palpitations or peripheral edema             Medications:   I have reviewed this patient's current medications    aspirin  81 mg Oral Daily     pantoprazole  40 mg Oral BID AC     sodium chloride (PF)  3 mL Intracatheter Q8H                  Physical Exam:   Vitals were reviewed  Vital Signs with Ranges  Temp:  [97.9  F (36.6  C)-98.2  F (36.8  C)] 97.9  F (36.6  C)  Pulse:  [58-91] 58  Heart Rate:  [68-89] 72  Resp:  [11-35] 16  BP: (102-149)/(56-97) 134/61  SpO2:  [69 %-98 %] 95 %  I/O last 3 completed shifts:  In: 123 [P.O.:120; I.V.:3]  Out: -   Constitutional: healthy, alert and no distress   Cardiovascular: negative, PMI normal. No lifts, heaves, or thrills. RRR. No murmurs, clicks gallops or rub  Respiratory: negative, Percussion normal. Good diaphragmatic excursion. Lungs clear  Head: Normocephalic. No masses, lesions, tenderness or abnormalities  Neck: Neck supple. No adenopathy.  Thyroid symmetric, normal size,, Carotids without bruits.  Abdomen: Abdomen soft, non-tender. BS normal. No masses, organomegaly             Data:   I reviewed the patient's new clinical lab test results.   Recent Labs   Lab Test 07/08/19  0841 07/07/19 1830 01/27/19  0657   WBC 4.9 6.3 5.8   HGB 14.8 14.8 14.7   MCV 90 91 93    182 182   INR  --  1.01  --      Recent Labs   Lab Test 07/08/19  0841 07/07/19  1830 01/27/19  0657   POTASSIUM 4.0 3.8 4.0   CHLORIDE 108 111* 109   CO2 27 27 23   BUN 18 23 21   ANIONGAP 6 6 8     Recent Labs   Lab Test 07/07/19  1830 01/21/19  1115   ALBUMIN 4.3  --    BILITOTAL 0.4  --    ALT 44  --    AST 25  --    PROTEIN  --  Negative   LIPASE 151  --        I reviewed the patient's new imaging results.    All laboratory data reviewed  All imaging studies reviewed by me.    Helen Dorantes PA-C,  7/9/2019  [facility]  Office : [phone]  Cell: [phone] (Dr. Cardoza)  Cell: [phone] (Helen Dorantes PA-C)

## 2019-07-09 NOTE — DISCHARGE SUMMARY
[facility]  Hospitalist Discharge Summary       Date of Admission:  7/7/2019  Date of Discharge:  7/9/2019  Discharging Provider: Luis Portillo MD      Discharge Diagnoses     PVT  Gastritis  Chronic Pancreatitis    Follow-ups Needed After Discharge   Follow-up Appointments     Follow-up and recommended labs and tests       Follow up with primary care provider, Imtiaz Martin, within 7 days   for hospital follow- up.  The following labs/tests are recommended: None.             Unresulted Labs Ordered in the Past 30 Days of this Admission     Date and Time Order Name Status Description    7/8/2019 1622 Surgical pathology exam In process     7/8/2019 1127 JAK2 Mutation NGS Analysis In process         Discharge Disposition   Discharged to home  Condition at discharge: Stable    Hospital Course         Brian Morrell is a 54 year old male admitted on 7/7/2019. He presents for evaluation of abdominal pain.      Abdominal Pain   PUD?   PVT (portal vein thrombosis)   Assessment: presents with 2 weeks of abdominal pain with PO intake.  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 Multiple areas of portosystemic collateral vein formation, including gastroesophageal varices, are likely related to portal hypertension resulting from the portal vein thrombosis. GI and Vascular medicine consulted. EGD showed gastritis with multiple erosions in the antrum of stomach and body of stomach. EUS consistent consistent with previous episode of pancreatitis leading to chronic pancreatitis. JAK2 pending, but vascular felt hold off on hypercoagulable work up. GI felt anticoagulation is not warranted.    Plan:   - Protonix at discharge  - Dr. Cardoza will schedule outpatient follow up Colonoscopy to assess diarrhea.     Weakness of left third cranial nerve  Follows with ophthalmology. No obvious vascular etiology to this symptom. normal catheter directed  angiogram in 01/2019    Consultations This Hospital Stay   [facility] IP CONSULT  GASTROENTEROLOGY IP CONSULT    Code Status   Full Code    Time Spent on this Encounter   I, Luis Portillo, personally saw the patient today and spent greater than 30 minutes discharging this patient.       Luis Portillo MD  [facility]  ______________________________________________________________________    Physical Exam   Vital Signs: Temp: 97.9  F (36.6  C) Temp src: Oral BP: 134/61 Pulse: 58 Heart Rate: 72 Resp: 16 SpO2: 95 % O2 Device: None (Room air)    Weight: 180 lbs 0 oz       Primary Care Physician   Imtiaz Martin    Discharge Orders      Reason for your hospital stay    You had abdominal pain, and needed Gastroenterology evaluation.     Follow-up and recommended labs and tests     Follow up with primary care provider, Imtiaz Martin, within 7 days for hospital follow- up.  The following labs/tests are recommended: None.     Activity    Your activity upon discharge: activity as tolerated     Diet    Follow this diet upon discharge: Low fat       Significant Results and Procedures   Most Recent 3 CBC's:  Recent Labs   Lab Test 07/08/19  0841 07/07/19  1830 01/27/19  0657   WBC 4.9 6.3 5.8   HGB 14.8 14.8 14.7   MCV 90 91 93    182 182     Most Recent 3 BMP's:  Recent Labs   Lab Test 07/08/19  0841 07/07/19  1830 01/27/19  0657    144 140   POTASSIUM 4.0 3.8 4.0   CHLORIDE 108 111* 109   CO2 27 27 23   BUN 18 23 21   CR 0.80 1.14 0.79   ANIONGAP 6 6 8   FATEMEH 8.8 8.5 8.2*   GLC 99 112* 96   ,   Results for orders placed or performed during the hospital encounter of 07/07/19   CT Abdomen Pelvis w Contrast    Narrative    CT ABDOMEN PELVIS WITH CONTRAST   7/7/2019 7:26 PM     HISTORY: Mid abdominal pain.    TECHNIQUE: 91 mL Isovue-370 IV were administered. After contrast  administration, volumetric helical sections were acquired from the  lung bases to the ischial tuberosities.  Coronal images were also  reconstructed. Radiation dose for this scan was reduced using  automated exposure control, adjustment of the mA and/or kV according  to patient size, or iterative reconstruction technique.    COMPARISON: None.     FINDINGS: No bowel obstruction. No convincing evidence for colitis or  diverticulitis. Unremarkable appendix. No free fluid in the pelvis.  There are multiple areas of portosystemic collateral vein formation  noted, including gastroesophageal varices. There appears to be  cavernous transformation of the portal vein. The liver, gallbladder,  spleen, adrenal glands, pancreas, and kidneys are otherwise  unremarkable. No hydronephrosis. Circumaortic left renal vein is an  anatomic variant. No intra-abdominal fluid collections. No free  intraperitoneal air. The visualized lung bases are clear.      Impression    IMPRESSION:   1. Cavernous transformation of the portal vein is likely related to  chronic portal vein thrombosis.  2. Multiple areas of portosystemic collateral vein formation,  including gastroesophageal varices, are likely related to portal  hypertension resulting from the portal vein thrombosis.     KACIE ARRINGTON MD       Discharge Medications   Current Discharge Medication List      START taking these medications    Details   pantoprazole (PROTONIX) 40 MG EC tablet Take 1 tablet (40 mg) by mouth daily  Qty: 30 tablet, Refills: 0    Associated Diagnoses: Peptic ulcer         CONTINUE these medications which have CHANGED    Details   aspirin (ASA) 81 MG tablet Take 1 tablet (81 mg) by mouth daily         CONTINUE these medications which have NOT CHANGED    Details   hypromellose (ARTIFICIAL TEARS) 0.5 % SOLN ophthalmic solution Place 1 drop into both eyes daily as needed for dry eyes           Allergies   Allergies   Allergen Reactions     No Known Allergies

## 2019-07-10 ENCOUNTER — TELEPHONE (OUTPATIENT)
Dept: INTERNAL MEDICINE | Facility: CLINIC | Age: 54
End: 2019-07-10

## 2019-07-10 LAB — COPATH REPORT: NORMAL

## 2019-07-10 NOTE — TELEPHONE ENCOUNTER
ED / Discharge Outreach Protocol    Patient Contact    Attempt # 1    Was call answered?  No.  Left message on voicemail with information to call me back. ( with  services)

## 2019-07-11 NOTE — TELEPHONE ENCOUNTER
"  ED for acute condition Discharge Protocol    \"Hi, my name is Roberta Hawkins, a registered nurse, and I am calling from [facility].  I am calling to follow up and see how things are going for you after your recent emergency visit.\"    Tell me how you are doing now that you are home?\" I'm feeling a little better. Stomach pain has gone away after eating. No nausea. Eating 3-4 small meals a day.  Doing well with Protonix. Patient reports no diarrhea.       Discharge Instructions    \"Let's review your discharge instructions.  What is/are the follow-up recommendations?  Pt. Response: f/u with PCP and Dr. Cardoza for Colonoscopy    \"Has an appointment with your primary care provider been scheduled?\"  Will call back to schedule as he has to make up time from work    Medications    \"Tell me what changed about your medicines when you discharged?\"    Added Protonix    \"What questions do you have about your medications?\"   None        Call Summary    \"What questions or concerns do you have about your recent visit and your follow-up care?\"     none    \"If you have questions or things don't continue to improve, we encourage you contact us through the main clinic number (give number).  Even if the clinic is not open, triage nurses are available 24/7 to help you.     We would like you to know that our clinic has extended hours (provide information).  We also have urgent care (provide details on closest location and hours/contact info)\"    \"Thank you for your time and take care!\"        Roberta KINNEY RN, BSN, PHN            "

## 2019-07-16 LAB — COPATH REPORT: NORMAL

## 2019-07-17 ENCOUNTER — TRANSFERRED RECORDS (OUTPATIENT)
Dept: HEALTH INFORMATION MANAGEMENT | Facility: CLINIC | Age: 54
End: 2019-07-17

## 2019-07-17 ENCOUNTER — TELEPHONE (OUTPATIENT)
Dept: OTHER | Facility: CLINIC | Age: 54
End: 2019-07-17

## 2019-07-17 NOTE — TELEPHONE ENCOUNTER
Background:  Patient admitted to ED/Hosp on 7/7/19 for abdominal pain.    Per hospital consult note from Dr. Rouse:  A JAK2 mutation analysis was ordered despite the fact that the patient does not have thrombocytosis as myeloproliferative disorders are a common cause of mesenteric venous thrombosis.      Called Carissa (daughter) and relayed per Dr. Rouse's instruction that AK2 mutation was normal, and no further f/u regarding that result is needed.    Patient's daughter inquired about starting anticoagulation.       Per Hospital Consult Note:  Readdress anticoagulation after the above procedures have been performed.  If anticoagulation is indicated due to a high risk of progressive thrombosis and minimal risk of bleeding noted on above procedures, I would favor initial treatment with Lovenox as opposed to Lovenox bridging with warfarin due to the lower half the time of Lovenox.  Duration of anticoagulation would be 3-6 months.    Routing to Dr. Rouse to advise.

## 2019-07-18 NOTE — TELEPHONE ENCOUNTER
Left VM for daughter indicating that her father should follow up with Dr. Cardoza prior to discussing anticoagulation.    I asked that she return my call to discuss further per Dr. Rouse's note at 9:12 today.    Helen Christianson RN BSN

## 2019-07-18 NOTE — TELEPHONE ENCOUNTER
Truthfully, this patient with chronic portal vein thrombosis is a complex situation which requires a face to face office encounter to address this matter.     Since the JAK2 mutation is normal, he does not require anything other than potential AC.     However, he has findings suggestive of cirrhosis on imaging. If he has cirrhosis, AC would likely not be indicated. He needs to see Dr. Cardoza to first discuss whether or not liver bx is warranted. Once he has seen Dr. Cardoza and that matter has been addressed, he can see me. However, I do not have availability due to previously planned vacations until September. Please schedule the patient to see me in September, and encourage the patient to clarify his hepatic issues. If he sees [facility] and is found to have cirrhosis, he should cancel his appointment with me as I would not favor AC if he has cirrhosis. If he gets a liver biopsy and is found to not have cirrhosis, he should see me. If [facility] elects not to perform a liver biopsy, please get his records form [facility] scanned into Epic, and route this encounter back to me to address.

## 2019-08-06 ENCOUNTER — TRANSFERRED RECORDS (OUTPATIENT)
Dept: HEALTH INFORMATION MANAGEMENT | Facility: CLINIC | Age: 54
End: 2019-08-06

## 2019-08-06 LAB
ALT SERPL-CCNC: 24 IU/L (ref 0–44)
AST SERPL-CCNC: 20 IU/L (ref 0–40)
CREAT SERPL-MCNC: 0.89 MG/DL (ref 0.76–1.27)
GFR SERPL CREATININE-BSD FRML MDRD: 98 ML/MIN/1.73M2
GLUCOSE SERPL-MCNC: 173 MG/DL (ref 65–99)
POTASSIUM SERPL-SCNC: 4.3 MMOL/L (ref 3.5–5.2)

## 2019-09-11 ENCOUNTER — OFFICE VISIT (OUTPATIENT)
Dept: INTERNAL MEDICINE | Facility: CLINIC | Age: 54
End: 2019-09-11
Payer: COMMERCIAL

## 2019-09-11 VITALS
DIASTOLIC BLOOD PRESSURE: 74 MMHG | TEMPERATURE: 98.4 F | SYSTOLIC BLOOD PRESSURE: 116 MMHG | RESPIRATION RATE: 20 BRPM | BODY MASS INDEX: 28.24 KG/M2 | WEIGHT: 169.5 LBS | HEIGHT: 65 IN | HEART RATE: 84 BPM

## 2019-09-11 DIAGNOSIS — M54.2 NECK PAIN: Primary | ICD-10-CM

## 2019-09-11 DIAGNOSIS — M54.50 ACUTE BILATERAL LOW BACK PAIN WITHOUT SCIATICA: ICD-10-CM

## 2019-09-11 DIAGNOSIS — M54.6 ACUTE BILATERAL THORACIC BACK PAIN: ICD-10-CM

## 2019-09-11 PROCEDURE — 99214 OFFICE O/P EST MOD 30 MIN: CPT | Performed by: PHYSICIAN ASSISTANT

## 2019-09-11 RX ORDER — CYCLOBENZAPRINE HCL 5 MG
5 TABLET ORAL 3 TIMES DAILY PRN
Qty: 30 TABLET | Refills: 0 | Status: SHIPPED | OUTPATIENT
Start: 2019-09-11

## 2019-09-11 RX ORDER — METHYLPREDNISOLONE 4 MG
TABLET, DOSE PACK ORAL
Qty: 21 TABLET | Refills: 0 | Status: SHIPPED | OUTPATIENT
Start: 2019-09-11

## 2019-09-11 ASSESSMENT — MIFFLIN-ST. JEOR: SCORE: 1545.73

## 2019-09-11 NOTE — PROGRESS NOTES
"Subjective     Brian Morrell is a 54 year old male who presents to clinic today for the following health issues:    HPI   Back Pain       Duration: 4+ weeks        Specific cause: weight lifting    Description:   Location of pain: upper back bilateral and lower neck bilateral, lower back bilateral  Character of pain: burning and constant  Pain radiation: radiation down into both arms sometimes, at the shoulder.  No pain into the hands  Pain a the neck as well   New numbness or weakness in legs, not attributed to pain:  Yes, some pain, but not numbness    Intensity: Currently 9/10    History:   Pain interferes with job: YES  History of back problems: no prior back problems  Any previous MRI or X-rays: None  Sees a specialist for back pain:  No  Therapies tried without relief: acetaminophen, but slight relief    Alleviating factors:     Improved by: acetaminophen (Tylenol) -slight improvement    Precipitating factors:  Worsened by: Lifting          Accompanying Signs & Symptoms:  Risk of Fracture:  None  Risk of Cauda Equina:  None  Risk of Infection:  None  Risk of Cancer:  None  Risk of Ankylosing Spondylitis:  Onset at age <35, male, AND morning back stiffness. no                -------------------------------------    BP Readings from Last 3 Encounters:   09/11/19 116/74   07/09/19 134/61   02/04/19 140/75    Wt Readings from Last 3 Encounters:   09/11/19 76.9 kg (169 lb 8 oz)   07/07/19 81.6 kg (180 lb)   02/04/19 80.7 kg (178 lb)                    -------------------------------------  Reviewed and updated as needed this visit by Provider  Allergies  Meds         Review of Systems   ROS COMP: Constitutional, HEENT, cardiovascular, pulmonary, gi and gu systems are negative, except as otherwise noted.      Objective    /74   Pulse 84   Temp 98.4  F (36.9  C) (Oral)   Resp 20   Ht 1.651 m (5' 5\")   Wt 76.9 kg (169 lb 8 oz)   BMI 28.21 kg/m    Body mass index is 28.21 kg/m .  Physical Exam " "  GENERAL: healthy, alert and no distress  NECK: no adenopathy, no asymmetry, masses, or scars and thyroid normal to palpation  RESP: lungs clear to auscultation - no rales, rhonchi or wheezes  CV: regular rates and rhythm and normal S1 S2, no S3 or S4  MS: tenderness trapezious and paraspinous muscles cervical spine.  Tenderness along the thoracic and lumbar paraspinous muscle areas.   Reflexes 1+ upper and lower  Strength equal bilaterally Upper and lower   And SLR negative   SKIN: no suspicious lesions or rashes  NEURO: Normal strength and tone, mentation intact and speech normal    Diagnostic Test Results:  none         Assessment & Plan       ICD-10-CM    1. Neck pain M54.2 [facility]     cyclobenzaprine (FLEXERIL) 5 MG tablet     methylPREDNISolone (MEDROL DOSEPAK) 4 MG tablet therapy pack   2. Acute bilateral thoracic back pain M54.6 [facility]     cyclobenzaprine (FLEXERIL) 5 MG tablet     methylPREDNISolone (MEDROL DOSEPAK) 4 MG tablet therapy pack   3. Acute bilateral low back pain without sciatica M54.5 [facility]     cyclobenzaprine (FLEXERIL) 5 MG tablet     methylPREDNISolone (MEDROL DOSEPAK) 4 MG tablet therapy pack        BMI:   Estimated body mass index is 28.21 kg/m  as calculated from the following:    Height as of this encounter: 1.651 m (5' 5\").    Weight as of this encounter: 76.9 kg (169 lb 8 oz).   Weight management plan: Patient was referred to their PCP to discuss a diet and exercise plan.    25 minute spent with patient > 50% of time on counseling and plan of care.    Icing and heat   Steroid pack,   Muscle relaxer and PT   See Patient Instructions    Return in about 2 weeks (around 9/25/2019) for Routine Visit, regular primary provider hospital follow up from July .    Fátima Luna PA-C  [facility]        "

## 2019-09-11 NOTE — NURSING NOTE
"Chief Complaint   Patient presents with     Back Pain     /74   Pulse 84   Temp 98.4  F (36.9  C) (Oral)   Resp 20   Ht 1.651 m (5' 5\")   Wt 76.9 kg (169 lb 8 oz)   BMI 28.21 kg/m   Estimated body mass index is 28.21 kg/m  as calculated from the following:    Height as of this encounter: 1.651 m (5' 5\").    Weight as of this encounter: 76.9 kg (169 lb 8 oz).        Health Maintenance due pending provider review:  NONE    n/a    Claudia Cao CMA  "

## 2019-09-21 ENCOUNTER — OFFICE VISIT (OUTPATIENT)
Dept: URGENT CARE | Facility: URGENT CARE | Age: 54
End: 2019-09-21
Payer: COMMERCIAL

## 2019-09-21 VITALS
OXYGEN SATURATION: 98 % | BODY MASS INDEX: 28.11 KG/M2 | DIASTOLIC BLOOD PRESSURE: 80 MMHG | HEART RATE: 74 BPM | WEIGHT: 168.9 LBS | TEMPERATURE: 97.5 F | RESPIRATION RATE: 16 BRPM | SYSTOLIC BLOOD PRESSURE: 112 MMHG

## 2019-09-21 DIAGNOSIS — R30.0 DYSURIA: Primary | ICD-10-CM

## 2019-09-21 DIAGNOSIS — N34.2 URETHRITIS: ICD-10-CM

## 2019-09-21 LAB
ALBUMIN UR-MCNC: NEGATIVE MG/DL
APPEARANCE UR: CLEAR
BILIRUB UR QL STRIP: NEGATIVE
COLOR UR AUTO: YELLOW
GLUCOSE UR STRIP-MCNC: NEGATIVE MG/DL
HGB UR QL STRIP: NEGATIVE
KETONES UR STRIP-MCNC: NEGATIVE MG/DL
LEUKOCYTE ESTERASE UR QL STRIP: NEGATIVE
NITRATE UR QL: NEGATIVE
PH UR STRIP: 6 PH (ref 5–7)
SOURCE: NORMAL
SP GR UR STRIP: 1.02 (ref 1–1.03)
UROBILINOGEN UR STRIP-ACNC: 0.2 EU/DL (ref 0.2–1)

## 2019-09-21 PROCEDURE — 87491 CHLMYD TRACH DNA AMP PROBE: CPT | Performed by: PHYSICIAN ASSISTANT

## 2019-09-21 PROCEDURE — 99214 OFFICE O/P EST MOD 30 MIN: CPT | Performed by: PHYSICIAN ASSISTANT

## 2019-09-21 PROCEDURE — 87591 N.GONORRHOEAE DNA AMP PROB: CPT | Performed by: PHYSICIAN ASSISTANT

## 2019-09-21 PROCEDURE — 87086 URINE CULTURE/COLONY COUNT: CPT | Performed by: PHYSICIAN ASSISTANT

## 2019-09-21 PROCEDURE — 81003 URINALYSIS AUTO W/O SCOPE: CPT | Performed by: PHYSICIAN ASSISTANT

## 2019-09-21 RX ORDER — DOXYCYCLINE HYCLATE 100 MG
100 TABLET ORAL 2 TIMES DAILY
Qty: 20 TABLET | Refills: 0 | Status: SHIPPED | OUTPATIENT
Start: 2019-09-21 | End: 2019-10-01

## 2019-09-22 LAB
BACTERIA SPEC CULT: NO GROWTH
C TRACH DNA SPEC QL NAA+PROBE: NEGATIVE
N GONORRHOEA DNA SPEC QL NAA+PROBE: NEGATIVE
SPECIMEN SOURCE: NORMAL

## 2019-09-23 NOTE — PROGRESS NOTES
SUBJECTIVE:   Brian Morrell is a 54 year old male who  presents today for a possible UTI. Symptoms of dysuria, urgency and frequency have been going on for 2week(s).  Hematuria no.  still presentand mild.  There is no history of fever, chills, nausea or vomiting.   This patient does  have a history of urinary tract infections. Patient denies long duration, rigors, flank pain, temperature > 101 degrees F. and Vomiting, significant nausea or diarrhea or vaginal discharge     Past Medical History:   Diagnosis Date     External bleeding hemorrhoids      Ocular inflammation      Allergies   Allergen Reactions     No Known Allergies      Social History     Tobacco Use     Smoking status: Never Smoker     Smokeless tobacco: Never Used   Substance Use Topics     Alcohol use: Yes     Comment: 3 drinks per month     Family History   Problem Relation Age of Onset     Arthritis Mother      No Known Problems Father        ROS:   CONSTITUTIONAL:NEGATIVE for fever, chills, change in weight  INTEGUMENTARY/SKIN: NEGATIVE for worrisome rashes, moles or lesions  ENT/MOUTH: NEGATIVE for ear, mouth and throat problems  RESP:NEGATIVE for significant cough or SOB  CV: NEGATIVE for chest pain, palpitations or peripheral edema  GI: NEGATIVE for nausea, abdominal pain, heartburn, or change in bowel habits  : dysuria and frequency of urination  VASC: Negative for cool extremities  MUSCULOSKELETAL: NEGATIVE for significant arthralgias or myalgia  NEURO: NEGATIVE for weakness, dizziness or paresthesias    OBJECTIVE:  /80 (BP Location: Right arm, Patient Position: Sitting, Cuff Size: Adult Regular)   Pulse 74   Temp 97.5  F (36.4  C) (Tympanic)   Resp 16   Wt 76.6 kg (168 lb 14.4 oz)   SpO2 98%   BMI 28.11 kg/m    GENERAL APPEARANCE: healthy, alert and no distress  NECK: no adenopathy  RESP: lungs clear to auscultation - no rales, rhonchi or wheezes  CV: regular rates and rhythm, normal S1 S2, no murmur noted  ABDOMEN:  soft,  nontender, no HSM or masses and bowel sounds normal  BACK: No CVA tenderness  : Negative for testicular pain, tenderness or urethral discharge  Extremities: no peripheral edema or tenderness, peripheral pulses normal  MS:  extremities normal- no gross deformities noted, no erythema, FROM noted in all extremities  SKIN: no suspicious lesions or rashes    Results for orders placed or performed in visit on 09/21/19   *UA reflex to Microscopic and Culture ([place] [facility] (except [place] and [place])   Result Value Ref Range    Color Urine Yellow     Appearance Urine Clear     Glucose Urine Negative NEG^Negative mg/dL    Bilirubin Urine Negative NEG^Negative    Ketones Urine Negative NEG^Negative mg/dL    Specific Gravity Urine 1.025 1.003 - 1.035    Blood Urine Negative NEG^Negative    pH Urine 6.0 5.0 - 7.0 pH    Protein Albumin Urine Negative NEG^Negative mg/dL    Urobilinogen Urine 0.2 0.2 - 1.0 EU/dL    Nitrite Urine Negative NEG^Negative    Leukocyte Esterase Urine Negative NEG^Negative    Source Midstream Urine    NEISSERIA GONORRHOEA PCR   Result Value Ref Range    Specimen Descrip Urine     N Gonorrhea PCR Negative NEG^Negative   CHLAMYDIA TRACHOMATIS PCR   Result Value Ref Range    Specimen Description Urine     Chlamydia Trachomatis PCR Negative NEG^Negative   Urine Culture Aerobic Bacterial   Result Value Ref Range    Specimen Description Midstream Urine     Culture Micro No growth        ASSESSMENT/PLAN      ICD-10-CM    1. Dysuria R30.0 *UA reflex to Microscopic and Culture ([place] [facility] (except [place] and [place])     NEISSERIA GONORRHOEA PCR     CHLAMYDIA TRACHOMATIS PCR     Urine Culture Aerobic Bacterial     doxycycline hyclate (VIBRA-TABS) 100 MG tablet   2. Urethritis N34.2 *UA reflex to Microscopic and Culture ([facility] (except [place] and [place])     NEISSERIA GONORRHOEA PCR     CHLAMYDIA TRACHOMATIS PCR     Urine Culture Aerobic Bacterial      doxycycline hyclate (VIBRA-TABS) 100 MG tablet       Orders Placed This Encounter     *UA reflex to Microscopic and Culture ([place] [facility] [facility] (except [place] and [place])     doxycycline hyclate (VIBRA-TABS) 100 MG tablet       Doxy for urethritis  STD and urine culture pending  Drink plenty of fluids.  Prevention and treatment of UTI's discussed.Signs and symptoms of pyelonephritis mentioned.    Follow up with primary care physician if not improving

## 2020-03-11 ENCOUNTER — HEALTH MAINTENANCE LETTER (OUTPATIENT)
Age: 55
End: 2020-03-11

## 2020-11-18 ENCOUNTER — OFFICE VISIT (OUTPATIENT)
Dept: INTERNAL MEDICINE | Facility: CLINIC | Age: 55
End: 2020-11-18
Payer: COMMERCIAL

## 2020-11-18 VITALS
DIASTOLIC BLOOD PRESSURE: 80 MMHG | BODY MASS INDEX: 32.37 KG/M2 | TEMPERATURE: 98.6 F | RESPIRATION RATE: 16 BRPM | HEIGHT: 63 IN | HEART RATE: 71 BPM | OXYGEN SATURATION: 98 % | SYSTOLIC BLOOD PRESSURE: 130 MMHG | WEIGHT: 182.7 LBS

## 2020-11-18 DIAGNOSIS — M54.50 LEFT-SIDED LOW BACK PAIN WITHOUT SCIATICA, UNSPECIFIED CHRONICITY: Primary | ICD-10-CM

## 2020-11-18 DIAGNOSIS — R82.90 NONSPECIFIC FINDING ON EXAMINATION OF URINE: ICD-10-CM

## 2020-11-18 LAB
ALBUMIN UR-MCNC: NEGATIVE MG/DL
APPEARANCE UR: CLEAR
BILIRUB UR QL STRIP: NEGATIVE
COLOR UR AUTO: YELLOW
GLUCOSE UR STRIP-MCNC: NEGATIVE MG/DL
HGB UR QL STRIP: NEGATIVE
KETONES UR STRIP-MCNC: NEGATIVE MG/DL
LEUKOCYTE ESTERASE UR QL STRIP: NEGATIVE
NITRATE UR QL: POSITIVE
PH UR STRIP: 6.5 PH (ref 5–7)
RBC #/AREA URNS AUTO: ABNORMAL /HPF
SOURCE: ABNORMAL
SP GR UR STRIP: 1.02 (ref 1–1.03)
UROBILINOGEN UR STRIP-ACNC: 0.2 EU/DL (ref 0.2–1)
WBC #/AREA URNS AUTO: ABNORMAL /HPF

## 2020-11-18 PROCEDURE — 36415 COLL VENOUS BLD VENIPUNCTURE: CPT | Performed by: INTERNAL MEDICINE

## 2020-11-18 PROCEDURE — 81001 URINALYSIS AUTO W/SCOPE: CPT | Performed by: INTERNAL MEDICINE

## 2020-11-18 PROCEDURE — 99213 OFFICE O/P EST LOW 20 MIN: CPT | Performed by: INTERNAL MEDICINE

## 2020-11-18 PROCEDURE — 87086 URINE CULTURE/COLONY COUNT: CPT | Performed by: INTERNAL MEDICINE

## 2020-11-18 PROCEDURE — 80048 BASIC METABOLIC PNL TOTAL CA: CPT | Performed by: INTERNAL MEDICINE

## 2020-11-18 ASSESSMENT — MIFFLIN-ST. JEOR: SCORE: 1560.91

## 2020-11-18 NOTE — PATIENT INSTRUCTIONS
- [facility] will contact you to schedule your physical therapy.  Please contact us if you do not hear from them.     - I will contact you with lab results from today.

## 2020-11-18 NOTE — PROGRESS NOTES
Subjective     Brian Morrell is a 55 year old male who presents to clinic today for the following health issues:    HPI         Back Pain  Onset/Duration: 2- 3weeks  Description:   Location of pain: low back left sometimes right side too  Character of pain: burning  Pain radiation: none and round to left hips up the left side  New numbness or weakness in legs, not attributed to pain: no   Intensity: Currently 0/10, varies  Progression of Symptoms: intermittent  History:   Specific cause: none  Pain interferes with job: no  History of back problems: no prior back problems  Any previous MRI or X-rays: None  Sees a specialist for back pain: No  Alleviating factors:   Improved by: Voltaren gel with some relief    Precipitating factors:  Worsened by: Sitting  Therapies tried and outcome: Voltaren with little relief     Accompanying Signs & Symptoms:  Risk of Fracture: None  Risk of Cauda Equina: None  Risk of Infection: None  Risk of Cancer: None  Risk of Ankylosing Spondylitis: Onset at age <35, male, AND morning back stiffness  no         Patient worried about back pain representing problem with internal organs, given previous difficulty with peptic ulcer disease.  Also curious about his kidneys.  Has been to a chiropractor on a somewhat limited basis.        Review of Systems   Constitutional, HEENT, cardiovascular, pulmonary, GI, , musculoskeletal, neuro, skin, endocrine and psych systems are negative, except as otherwise noted.      Objective    There were no vitals taken for this visit.  There is no height or weight on file to calculate BMI.  Physical Exam   GENERAL: healthy, alert and no distress  NECK: no adenopathy, no asymmetry, masses, or scars and thyroid normal to palpation  RESP: lungs clear to auscultation - no rales, rhonchi or wheezes  CV: regular rate and rhythm, normal S1 S2, no S3 or S4, no murmur, click or rub, no peripheral edema and peripheral pulses strong  ABDOMEN: soft, nontender, no  hepatosplenomegaly, no masses and bowel sounds normal  MS: Clear tenderness in left paraspinous musculature and lumbar region.  Reflexes and lower extremity strength are normal.            Assessment & Plan     Left-sided low back pain without sciatica, unspecified chronicity  Musculoskeletal back pain.  Commended conservative physical therapy based care, will check labs as ordered to rule out kidney/urinary tract problem (doubt).  - Basic metabolic panel  (Ca, Cl, CO2, Creat, Gluc, K, Na, BUN)  - UA with Microscopic  - [facility]FERR[facility]; Future        See Patient Instructions    No follow-ups on file.    Imtiaz Martin MD  [facility]

## 2020-11-19 LAB
ANION GAP SERPL CALCULATED.3IONS-SCNC: 3 MMOL/L (ref 3–14)
BUN SERPL-MCNC: 16 MG/DL (ref 7–30)
CALCIUM SERPL-MCNC: 8.7 MG/DL (ref 8.5–10.1)
CHLORIDE SERPL-SCNC: 108 MMOL/L (ref 94–109)
CO2 SERPL-SCNC: 28 MMOL/L (ref 20–32)
CREAT SERPL-MCNC: 0.98 MG/DL (ref 0.66–1.25)
GFR SERPL CREATININE-BSD FRML MDRD: 88 ML/MIN/{1.73_M2}
GLUCOSE SERPL-MCNC: 155 MG/DL (ref 70–99)
POTASSIUM SERPL-SCNC: 4 MMOL/L (ref 3.4–5.3)
SODIUM SERPL-SCNC: 139 MMOL/L (ref 133–144)

## 2020-11-20 LAB
BACTERIA SPEC CULT: NO GROWTH
Lab: NORMAL
SPECIMEN SOURCE: NORMAL

## 2020-11-27 ENCOUNTER — APPOINTMENT (OUTPATIENT)
Dept: INTERPRETER SERVICES | Facility: CLINIC | Age: 55
End: 2020-11-27
Payer: COMMERCIAL

## 2021-01-03 ENCOUNTER — HEALTH MAINTENANCE LETTER (OUTPATIENT)
Age: 56
End: 2021-01-03

## 2021-04-02 ENCOUNTER — APPOINTMENT (OUTPATIENT)
Dept: INTERPRETER SERVICES | Facility: CLINIC | Age: 56
End: 2021-04-02
Payer: COMMERCIAL

## 2021-04-25 ENCOUNTER — HEALTH MAINTENANCE LETTER (OUTPATIENT)
Age: 56
End: 2021-04-25

## 2021-10-10 ENCOUNTER — HEALTH MAINTENANCE LETTER (OUTPATIENT)
Age: 56
End: 2021-10-10

## 2022-05-21 ENCOUNTER — HEALTH MAINTENANCE LETTER (OUTPATIENT)
Age: 57
End: 2022-05-21

## 2022-09-18 ENCOUNTER — HEALTH MAINTENANCE LETTER (OUTPATIENT)
Age: 57
End: 2022-09-18

## 2023-06-04 ENCOUNTER — HEALTH MAINTENANCE LETTER (OUTPATIENT)
Age: 58
End: 2023-06-04

## 2023-08-06 ENCOUNTER — OFFICE VISIT (OUTPATIENT)
Dept: URGENT CARE | Facility: URGENT CARE | Age: 58
End: 2023-08-06
Payer: COMMERCIAL

## 2023-08-06 VITALS
BODY MASS INDEX: 33.87 KG/M2 | RESPIRATION RATE: 16 BRPM | DIASTOLIC BLOOD PRESSURE: 82 MMHG | HEART RATE: 76 BPM | SYSTOLIC BLOOD PRESSURE: 145 MMHG | OXYGEN SATURATION: 98 % | TEMPERATURE: 98.4 F | WEIGHT: 188.2 LBS

## 2023-08-06 DIAGNOSIS — B36.0 TINEA VERSICOLOR: Primary | ICD-10-CM

## 2023-08-06 PROCEDURE — 99203 OFFICE O/P NEW LOW 30 MIN: CPT | Performed by: NURSE PRACTITIONER

## 2023-08-06 NOTE — PATIENT INSTRUCTIONS
Discussed the pathophysiology and benign nature.  Reviewed of medications including intermittant selenium containing products to keep at bay: Selenium sulfide (Selsun, Exsel) 2.5% lotion   Apply lather neck to knees   Apply once daily for 7 days   Wash off after 5-10 minutes   Repeat courses as needed.

## 2023-08-06 NOTE — PROGRESS NOTES
Assessment & Plan     Tinea versicolor     Patient Instructions   Discussed the pathophysiology and benign nature.  Reviewed of medications including intermittant selenium containing products to keep at bay: Selenium sulfide (Selsun, Exsel) 2.5% lotion   Apply lather neck to knees   Apply once daily for 7 days   Wash off after 5-10 minutes   Repeat courses as needed.         Return in about 1 week (around 8/13/2023) for with regular provider if symptoms persist.    AMBER Yost CNP  [facility] LUIS MIGUEL Torres is a 58 year old male who presents to clinic today for the following health issues:  Chief Complaint   Patient presents with    Rash     Patient presents with complain of rash on the torso, legs and arms for the last two weeks.      HPI    Rash    Onset of rash was 3 week(s) ago.   Course of illness is gradual onset and worsening.  Severity moderate  Current and Associated symptoms: itching, dry, and red   Location of the rash: abdomen, arm, upper, back, and chest.  Previous history of a similar rash? No  Recent exposure history: none known  Denies exposure to: animals, chicken pox, environmental allergens, medications, new household products, and new skincare products  Associated symptoms include: nothing.  Treatment measures tried include: otc hydrocortisone cream  Cortisone not helpful, maybe made it worse.      Review of Systems  Constitutional, HEENT, cardiovascular, pulmonary, GI, , musculoskeletal, neuro, skin, endocrine and psych systems are negative, except as otherwise noted.      Objective    BP (!) 145/82 (BP Location: Right arm, Patient Position: Sitting, Cuff Size: Adult Large)   Pulse 76   Temp 98.4  F (36.9  C) (Oral)   Resp 16   Wt 85.4 kg (188 lb 3.2 oz)   SpO2 98%   BMI 33.87 kg/m    Physical Exam   GENERAL: healthy, alert and no distress  NECK: no adenopathy, no asymmetry, masses, or scars and thyroid normal to palpation  RESP: lungs  clear to auscultation - no rales, rhonchi or wheezes  CV: regular rate and rhythm, normal S1 S2, no S3 or S4, no murmur, click or rub, no peripheral edema and peripheral pulses strong  MS: no gross musculoskeletal defects noted, no edema  SKIN: macule - scattered on chest, back abdomen and upper arms pale pink with thin placques appears consistent with tinea versicolor.

## 2024-03-19 ENCOUNTER — NURSE TRIAGE (OUTPATIENT)
Dept: NURSING | Facility: CLINIC | Age: 59
End: 2024-03-19
Payer: COMMERCIAL

## 2024-03-19 NOTE — TELEPHONE ENCOUNTER
Pt calling with assistance of  re: rash.    Rash started on the right arm Sunday. Started having pain on right arm, shoulder and right side of back Thursday through Saturday. Pt states that he did have chicken pox as a child. Had a fever last night.    The rash is only on the right side. There are two clusters where the rash is located. Looks blister-like. Pt says they are not painful.     Protocol recommends See in Office Today. If not appts in office, UC would be fine as well. Reviewed care advice and call back information. Pt's daughter on the phone to assist with interpretation and communication as well (C2C on file).     Alfreda Harmon, RN, BSN  [facility]   Triage Nurse Advisor    Reason for Disposition   Shingles rash (matches SYMPTOMS) and onset < 72 hours ago (3 days)    Additional Information   Negative: Difficult to awaken or acting confused (e.g., disoriented, slurred speech)   Negative: Sounds like a life-threatening emergency to the triager   Negative: Localized rash and doesn't match the SYMPTOMS of shingles   Negative: Back pain and doesn't match the SYMPTOMS of shingles   Negative: Shingles Vaccine (Recombinant Zoster Vaccine; RZV; Shingrix), questions about   Negative: Shingles rash of face and eye pain or blurred vision   Negative: Shingles rash on the eyelid or tip of the nose   Negative: Shingles rash and spots start appearing other places on body   Negative: Patient sounds very sick or weak to the triager   Negative: Shingles rash (matches SYMPTOMS) and weak immune system (e.g., HIV positive, cancer chemotherapy, chronic steroid treatment, splenectomy) and NOT taking antiviral medication   Negative: Shingles rash of face and facial weakness   Negative: Shingles rash of face or ear and earache or ringing in the ear   Negative: Fever > 100.4 F  (38.0 C)   Negative: SEVERE pain (e.g., excruciating)    Protocols used: Shingles (Zoster)-A-OH

## 2024-03-20 ENCOUNTER — OFFICE VISIT (OUTPATIENT)
Dept: FAMILY MEDICINE | Facility: CLINIC | Age: 59
End: 2024-03-20
Payer: COMMERCIAL

## 2024-03-20 VITALS
DIASTOLIC BLOOD PRESSURE: 83 MMHG | TEMPERATURE: 98.4 F | SYSTOLIC BLOOD PRESSURE: 143 MMHG | HEART RATE: 88 BPM | RESPIRATION RATE: 16 BRPM | OXYGEN SATURATION: 96 %

## 2024-03-20 DIAGNOSIS — B02.9 HERPES ZOSTER WITHOUT COMPLICATION: Primary | ICD-10-CM

## 2024-03-20 PROCEDURE — 99213 OFFICE O/P EST LOW 20 MIN: CPT

## 2024-03-20 RX ORDER — VALACYCLOVIR HYDROCHLORIDE 1 G/1
1000 TABLET, FILM COATED ORAL 3 TIMES DAILY
Qty: 21 TABLET | Refills: 0 | Status: SHIPPED | OUTPATIENT
Start: 2024-03-20 | End: 2024-03-27

## 2024-03-20 NOTE — PATIENT INSTRUCTIONS
Take 1 Tylenol (500 mg)and 1 ibuprofen (200 mg) pill together to help with pain every 4-6 hours as needed.   Cool soaks to the areas affected  Take antiviral as directed.   Get the Shingles vaccine once this all clears up.

## 2024-03-20 NOTE — PROGRESS NOTES
Assessment & Plan     Herpes zoster without complication    - valACYclovir (VALTREX) 1000 mg tablet; Take 1 tablet (1,000 mg) by mouth 3 times daily for 7 days        Patient Instructions   Take 1 Tylenol (500 mg)and 1 ibuprofen (200 mg) pill together to help with pain every 4-6 hours as needed.   Cool soaks to the areas affected  Take antiviral as directed.   Get the Shingles vaccine once this all clears up.     Return in about 1 week (around 3/27/2024), or if symptoms worsen or fail to improve.    Anastasiya Torres is a 59 year old, presenting for the following health issues:  Urgent Care (Painful blisters in right arm, itching x Sunday night - pain  in torso started 2 days before rash was visible in right arm /Had similar sx last year and was told to see dermatology )    HPI       Rash  Onset/Duration: 2-3 days ago  Description  Location: right arm  Character: blotchy, painful, burning, red  Itching: no  Intensity:  moderate  Progression of Symptoms:  same  Accompanying signs and symptoms:   Fever: No  Body aches or joint pain: No  Sore throat symptoms: No  Recent cold symptoms: No  History:           Previous episodes of similar rash: possibly last year  New exposures:  None  Recent travel: No  Exposure to similar rash: No  Precipitating or alleviating factors: had chicken pox as a child  Therapies tried and outcome: none        Review of Systems  Constitutional, HEENT, cardiovascular, pulmonary, gi and gu systems are negative, except as otherwise noted.      Objective    BP (!) 143/83   Pulse 88   Temp 98.4  F (36.9  C) (Tympanic)   Resp 16   SpO2 96%   There is no height or weight on file to calculate BMI.  Physical Exam   GENERAL: alert and no distress  SKIN: 2 areas on right upper extremity that are clusters of erythemic blisterlike lesions surrounded by erythema.  First area is on the bicep area, second cluster is around the antecubital            Signed Electronically by: [facility]

## 2024-07-14 ENCOUNTER — HEALTH MAINTENANCE LETTER (OUTPATIENT)
Age: 59
End: 2024-07-14

## 2025-07-19 ENCOUNTER — HEALTH MAINTENANCE LETTER (OUTPATIENT)
Age: 60
End: 2025-07-19

## (undated) RX ORDER — FENTANYL CITRATE 50 UG/ML
INJECTION, SOLUTION INTRAMUSCULAR; INTRAVENOUS
Status: DISPENSED
Start: 2019-01-26

## (undated) RX ORDER — FENTANYL CITRATE 50 UG/ML
INJECTION, SOLUTION INTRAMUSCULAR; INTRAVENOUS
Status: DISPENSED
Start: 2019-07-08

## (undated) RX ORDER — HEPARIN SODIUM 1000 [USP'U]/ML
INJECTION, SOLUTION INTRAVENOUS; SUBCUTANEOUS
Status: DISPENSED
Start: 2019-01-26

## (undated) RX ORDER — LIDOCAINE HYDROCHLORIDE 10 MG/ML
INJECTION, SOLUTION EPIDURAL; INFILTRATION; INTRACAUDAL; PERINEURAL
Status: DISPENSED
Start: 2019-01-26